# Patient Record
Sex: FEMALE | Race: WHITE | NOT HISPANIC OR LATINO | Employment: OTHER | URBAN - NONMETROPOLITAN AREA
[De-identification: names, ages, dates, MRNs, and addresses within clinical notes are randomized per-mention and may not be internally consistent; named-entity substitution may affect disease eponyms.]

---

## 2017-04-12 ENCOUNTER — OFFICE VISIT (OUTPATIENT)
Dept: CARDIOLOGY | Facility: CLINIC | Age: 62
End: 2017-04-12

## 2017-04-12 VITALS
WEIGHT: 241 LBS | HEIGHT: 66 IN | SYSTOLIC BLOOD PRESSURE: 120 MMHG | HEART RATE: 72 BPM | DIASTOLIC BLOOD PRESSURE: 70 MMHG | BODY MASS INDEX: 38.73 KG/M2

## 2017-04-12 DIAGNOSIS — Z79.01 CURRENT USE OF LONG TERM ANTICOAGULATION: ICD-10-CM

## 2017-04-12 DIAGNOSIS — F17.210 CIGARETTE SMOKER: ICD-10-CM

## 2017-04-12 DIAGNOSIS — J43.8 OTHER EMPHYSEMA (HCC): ICD-10-CM

## 2017-04-12 DIAGNOSIS — Z95.2 S/P AVR: Primary | ICD-10-CM

## 2017-04-12 DIAGNOSIS — I10 ESSENTIAL HYPERTENSION: ICD-10-CM

## 2017-04-12 DIAGNOSIS — I73.9 PAD (PERIPHERAL ARTERY DISEASE) (HCC): ICD-10-CM

## 2017-04-12 DIAGNOSIS — E78.00 HYPERCHOLESTEREMIA: ICD-10-CM

## 2017-04-12 DIAGNOSIS — I77.1 STENOSIS OF LEFT SUBCLAVIAN ARTERY (HCC): ICD-10-CM

## 2017-04-12 DIAGNOSIS — I77.9 RIGHT-SIDED CAROTID ARTERY DISEASE (HCC): ICD-10-CM

## 2017-04-12 PROCEDURE — 99213 OFFICE O/P EST LOW 20 MIN: CPT | Performed by: NURSE PRACTITIONER

## 2017-04-12 PROCEDURE — 99406 BEHAV CHNG SMOKING 3-10 MIN: CPT | Performed by: NURSE PRACTITIONER

## 2017-04-12 RX ORDER — ALBUTEROL SULFATE 2.5 MG/3ML
2.5 SOLUTION RESPIRATORY (INHALATION) EVERY 4 HOURS PRN
COMMUNITY
End: 2018-05-09 | Stop reason: ALTCHOICE

## 2017-04-12 NOTE — PROGRESS NOTES
Chief Complaint   Patient presents with   • Follow-up     Several episodes of chest pain that occured at rest, went to ER twice, told her more than likely noncardiac.  Pt feels related to pulmonary. SOB is about the same.    • Med Refill     PCP writes refills and moniters routine labs.    • Nicotine Dependence     Still smoking, states she is going to try to stop smoking again in the morning.        Subjective       Jolene Boyer is a 61 y.o. female with a complicated medical history. Pt has a history of severe aortic stenosis with s/p AVR in 2007. A carotid US was done in 2015 that showed a small ALMA. Before patient could be scheduled for the CTA, she went to the ER at [facility] with COPD exacerbation. She refused admission and went home on zpack and steroid therapy. She later become more short of breath and was admitted in January of 2016 with COPD exacerbation. A CTA was performed that showed 90% ALMA and left subclavian stenosis. Dr. Lehman was consulted and decided that since the patient had such frequent admissions for COPD and pneumonia and continued to smoke 1ppd, it would be better to manage with antiplatelet therapy and smoking cessation. He discussed with the patient possibility of repairing the subclavian if she could quit smoking. Later she was admitted to  for respiratory failure.. She was maintained on a ventilator until she could be weaned. She also underwent a repair of a rectus sheath hematoma while at  secondary to possible HIT with supratherapeutic PTT. Pt later developed SVT and was given adenosine and metoprolol. She then went into atrial flutter with plans for cardizem drip, when the patient converted to NSR.   At her June 2016 office visit Ms. Boyer reported a couple episodes of sharp chest pains and increased shortness of breath. An echocardiogram was ordered and showed valve well seated. IN August 2016 she was given clearance for EGD and colonoscopy. A repeat echocardiogram in December  "remained stable. Today she comes to the office for a follow up appointment. Her cardiac symptoms are \"about the same\". Unfortunately, she continues to smoke despite all her problems.     HPI         Cardiac History:    Past Surgical History:   Procedure Laterality Date   • ABDOMINAL SURGERY Left 01/10/2016    Rectus Sheath Hematoma removed   • AORTIC VALVE REPAIR/REPLACEMENT  06/2007    AVR per Dr. Trent   • CARDIOVASCULAR STRESS TEST  06/04/2007    Stress- 3min, 85% THR. BP- 190/96. R/O Apical Ischemia   • CARDIOVASCULAR STRESS TEST  05/13/2013    L. Myoview- ([facility]. Dr. Hernández) R/O Apical Ischemia   • CATH LAB PROCEDURE  06/08/2007    Cath- Mod. AI, Normal coronaries   • CHOLECYSTECTOMY     • COLONOSCOPY  2009   • COLONOSCOPY N/A 8/23/2016     COLONOSCOPY FOR SCREENING -  Surgeon: Meaghan Pham, DO:Hyperplastic polyp   • CORONARY STENT PLACEMENT     • ECHO - CONVERTED  07/17/2007    Echo- EF >60%, AVELINO 1.5 cm2   • ECHO - CONVERTED  01/18/2010    Echo- EF >60%. AVELINO 1.3 cm2   • ECHO - CONVERTED  05/03/2013    Echo- (Dr. Alfred) EF 65%   • ECHO - CONVERTED  12/17/2014    Echo- EF 65%. AVELINO- 1.6 cm2   • ECHO - CONVERTED  12/14/2016    LVH, EF 60%, prosthethic aortic valve well seated, mod PHT- RVSP 43 mmHg   • FOOT SURGERY Left    • HERNIA REPAIR     • IR ANGIO EXT CAROTID CIRC UNILATERAL  12/2015    CTA:  severe stenosis to ALMA, tight stenosis of left subclavian at takeoff   • KNEE SURGERY     • TONSILLECTOMY     • TUBAL ABDOMINAL LIGATION     • US CAROTID UNILATERAL  12/22/2015    Bilateral:  small ALMA, CTA recommended.       Current Outpatient Prescriptions   Medication Sig Dispense Refill   • albuterol (PROAIR HFA) 108 (90 BASE) MCG/ACT inhaler Inhale 2 puffs every 4 (four) hours as needed for wheezing.     • albuterol (PROVENTIL) (2.5 MG/3ML) 0.083% nebulizer solution Take 2.5 mg by nebulization Every 4 (Four) Hours As Needed for Wheezing.     • atenolol (TENORMIN) 25 MG tablet Take 25 mg by mouth daily.   "   • buPROPion SR (WELLBUTRIN SR) 150 MG 12 hr tablet Take 150 mg by mouth 2 (two) times a day.     • furosemide (LASIX) 40 MG tablet Take 40 mg by mouth Daily. Can take twice a day prn increased edema     • guaifenesin-dextromethorphan (MUCINEX DM)  MG tablet sustained-release 12 hour tablet Take  by mouth every 12 (twelve) hours.     • lisinopril (PRINIVIL,ZESTRIL) 5 MG tablet Take 5 mg by mouth daily.     • metFORMIN (GLUCOPHAGE) 500 MG tablet Take 500 mg by mouth 2 (two) times a day with meals.     • nitroglycerin (NITROSTAT) 0.4 MG SL tablet 1 under the tongue as needed for angina, may repeat q5mins for up three doses 30 tablet 8   • O2 (OXYGEN) Inhale 2 L/min Every Night.     • potassium chloride (K-DUR,KLOR-CON) 20 MEQ CR tablet Take 20 mEq by mouth. Takes once a day     • simvastatin (ZOCOR) 40 MG tablet Take 40 mg by mouth every night.     • theophylline (MIGDALIA-24) 300 MG 24 hr capsule Take 300 mg by mouth Daily.     • Umeclidinium Bromide (INCRUSE ELLIPTA) 62.5 MCG/INH aerosol powder  Inhale.     • venlafaxine (EFFEXOR) 75 MG tablet Take 75 mg by mouth 2 (two) times a day.     • warfarin (COUMADIN) 10 MG tablet Take 10 mg by mouth daily. (PCP monitors)       No current facility-administered medications for this visit.        Risperdal [risperidone]; Seroquel [quetiapine fumarate]; and Zoloft [sertraline hcl]    Past Medical History:   Diagnosis Date   • Anemia    • Anticoagulant long-term use    • Anxiety    • Aortic valve prosthesis present    • Arthritis    • Asthma    • Chronic cough    • Colon polyps    • COPD (chronic obstructive pulmonary disease)     followed by Dr. Kilgore   • Depression    • Diabetes    • GERD (gastroesophageal reflux disease)    • History of anticoagulant therapy     Chronic. Prosthetic Valve    • History of blood transfusion     Pt states she had reaction to blood that causes abnormal heart rhythm    • History of cholecystectomy    • Hypercholesteremia    • Hyperlipidemia    •  "Hypertension    • Panic attack    • PHT (portal hypertension)    • Sleep apnea        Social History     Social History   • Marital status:      Spouse name: N/A   • Number of children: 1   • Years of education: Associates Degree     Occupational History   • RETIRED      Medicaid Worker      Social History Main Topics   • Smoking status: Current Every Day Smoker     Packs/day: 1.00     Years: 39.00     Types: Cigarettes   • Smokeless tobacco: Never Used   • Alcohol use No   • Drug use: No   • Sexual activity: Defer     Other Topics Concern   • Not on file     Social History Narrative       Family History   Problem Relation Age of Onset   • Coronary artery disease Mother    • Colon polyps Mother    • Colon cancer Mother    • Ulcers Mother    • Cancer Mother    • Cancer Father    • Diabetes Father    • Hypertension Brother    • Diabetes Brother    • Heart failure Maternal Grandmother    • Heart failure Maternal Grandfather    • Lung disease Paternal Grandfather        Review of Systems   Constitutional: Positive for fatigue. Negative for activity change and appetite change.   HENT: Negative for nosebleeds, sinus pressure and trouble swallowing.    Gastrointestinal: Positive for nausea.   Genitourinary: Positive for dysuria, hematuria (mild) and urgency.   Musculoskeletal: Positive for arthralgias. Negative for gait problem and myalgias.   Neurological: Positive for light-headedness (rarely) and numbness (left hand). Negative for dizziness, syncope and weakness.   Hematological: Bruises/bleeds easily.   Psychiatric/Behavioral: Positive for sleep disturbance (uses bipap). Negative for confusion. The patient is nervous/anxious.        Diabetes- Yes  Thyroid-normal    Objective     /70  Pulse 72  Ht 66\" (167.6 cm)  Wt 241 lb (109 kg)  BMI 38.9 kg/m2    Physical Exam   Constitutional: She is oriented to person, place, and time. Vital signs are normal.   Eyes: Pupils are equal, round, and reactive to " light.   Neck: Neck supple. Carotid bruit is not present. No edema present.   Cardiovascular: Normal rate, regular rhythm and S1 normal.    Murmur heard.   Systolic murmur is present with a grade of 2/6   Pulses:       Radial pulses are 2+ on the right side, and 2+ on the left side.   Loud S2   Pulmonary/Chest: No respiratory distress. She has decreased breath sounds in the right lower field and the left lower field. She has wheezes.   Abdominal: Soft. Bowel sounds are normal.   Musculoskeletal: She exhibits no edema.   Neurological: She is alert and oriented to person, place, and time.   Skin: Skin is warm and dry.   Psychiatric: She has a normal mood and affect. Her behavior is normal. Judgment and thought content normal.   Vitals reviewed.    Procedures        Assessment/Plan      Jolene was seen today for follow-up, med refill and nicotine dependence.    Diagnoses and all orders for this visit:    S/P AVR    Stenosis of left subclavian artery    Other emphysema    PAD (peripheral artery disease)    Right-sided carotid artery disease    Essential hypertension    Cigarette smoker    Hypercholesteremia    Current use of long term anticoagulation      The report of her recent echocardiogram was reviewed which showed her valve prothesis well seated. Her blood pressure and heart rate are stable. Her weight has increased about 15 pounds which she attributes to over eating. I discussed with her significant need to stop smoking and change dietary habits for her overall health. She understands she is not a candidate for surgery on left subclavian as long as she continues to smoke. We discussed for over 3 minutes use of patches, Jian Johnathan method available at local health department and Chantix. Her insurance does not cover Chantix. She is willing to check on Jian Johnathan program.  For management of COPD she follows with Dr. Gonzalez. She follows with PCP for management of labs. No changes to her current cardiac  medication made today. No further cardiac workup ordered at this time. We will see her back in 6 months or sooner for problems.              Electronically signed by TANIA Trivedi,  April 13, 2017 11:47 AM

## 2017-04-13 ENCOUNTER — TELEPHONE (OUTPATIENT)
Dept: CARDIOLOGY | Facility: CLINIC | Age: 62
End: 2017-04-13

## 2017-04-13 NOTE — TELEPHONE ENCOUNTER
CLAUDIA    Received call from patient stating that you wanted to know name of medication she was taking states is Topamax. Thank you.

## 2017-04-13 NOTE — TELEPHONE ENCOUNTER
She can try topamax 25 mg daily since she has found beneficial in the past to help her stop smoking.

## 2017-10-16 ENCOUNTER — OFFICE VISIT (OUTPATIENT)
Dept: CARDIOLOGY | Facility: CLINIC | Age: 62
End: 2017-10-16

## 2017-10-16 ENCOUNTER — OUTSIDE FACILITY SERVICE (OUTPATIENT)
Dept: CARDIOLOGY | Facility: CLINIC | Age: 62
End: 2017-10-16

## 2017-10-16 ENCOUNTER — HOSPITAL ENCOUNTER (OUTPATIENT)
Dept: CARDIOLOGY | Facility: HOSPITAL | Age: 62
Discharge: HOME | End: 2017-10-16
Admitting: NURSE PRACTITIONER

## 2017-10-16 VITALS
SYSTOLIC BLOOD PRESSURE: 130 MMHG | BODY MASS INDEX: 36.8 KG/M2 | HEART RATE: 60 BPM | DIASTOLIC BLOOD PRESSURE: 70 MMHG | HEIGHT: 66 IN | WEIGHT: 229 LBS

## 2017-10-16 DIAGNOSIS — R07.89 OTHER CHEST PAIN: ICD-10-CM

## 2017-10-16 DIAGNOSIS — J43.8 OTHER EMPHYSEMA (HCC): ICD-10-CM

## 2017-10-16 DIAGNOSIS — R06.02 SHORTNESS OF BREATH: ICD-10-CM

## 2017-10-16 DIAGNOSIS — Z79.01 CHRONIC ANTICOAGULATION: ICD-10-CM

## 2017-10-16 DIAGNOSIS — I10 ESSENTIAL HYPERTENSION: ICD-10-CM

## 2017-10-16 DIAGNOSIS — Z95.2 S/P AVR: ICD-10-CM

## 2017-10-16 DIAGNOSIS — E78.49 OTHER HYPERLIPIDEMIA: ICD-10-CM

## 2017-10-16 DIAGNOSIS — Z95.2 S/P AVR: Primary | ICD-10-CM

## 2017-10-16 PROCEDURE — 93306 TTE W/DOPPLER COMPLETE: CPT | Performed by: INTERNAL MEDICINE

## 2017-10-16 PROCEDURE — 93306 TTE W/DOPPLER COMPLETE: CPT

## 2017-10-16 PROCEDURE — 99214 OFFICE O/P EST MOD 30 MIN: CPT | Performed by: NURSE PRACTITIONER

## 2017-10-16 RX ORDER — VARENICLINE TARTRATE 1 MG/1
1 TABLET, FILM COATED ORAL DAILY
COMMUNITY
End: 2018-05-09 | Stop reason: SDDI

## 2017-10-16 NOTE — PROGRESS NOTES
Chief Complaint   Patient presents with   • Follow-up     For cardiac management. Patient did not bring medication list-verbalized current medication list, PCP refills medications. She reports last labs last week. She reports was in ER about a week ago due to bleeding, she is to have uterine biopsy soon.   • Palpitations     She reports having occasional palpitations.   • Shortness of Breath     She reports always has shortness of breath relates to COPD. She states has some pain in left chest, relates to left lung.       Cardiac Complaints  chest pressure/discomfort and dyspnea      Subjective   Jolene Boyer is a 62 y.o. female with a complicated medical history including severe aortic stenosis with s/p AVR in 2007. A carotid US was done in 2015 that showed a small ALMA. A CTA was performed in the hospital at [facility] in January of 2016 while the patient was admitted for COPD exacerbation, that showed 90% ALMA and left subclavian stenosis. Dr. Lehman was consulted and decided that since the patient had such frequent admissions for COPD and pneumonia and continued to smoke 1ppd, it would be better to manage with antiplatelet therapy and smoking cessation. Later while at  for rectus sheath hematoma due to possible HIT with subtherapeutic PTT and  later developed SVT and was given adenosine and metoprolol, then later She then went into atrial flutter with plans for cardizem drip, when the patient converted to NSR. Most recent echo in December of 2016 showed aortic valve well seated and no other significant abnormalities seen. She returns today for follow up and denies any new cardiac concerns.  She does admit to some occasional palpitations but states they are no worse than prior.  She denies chest pain but does admit to some shortness of breath which she always has and relates to her COPD, for which she continues to follow with Dr. Gonzalez in regards.  She says it seems like it is hard to breath sometimes and states it  will hurt in her left lung when she takes a deep breath.  She also reports being in the ER at [facility] last week for bleeding, which she reports was coming from her uterus.  She states at ER they said her blood was too thin and then they held her coumadin, she states it has now gotten too thick and she is to have her blood rechecked tomorrow with Dr. Savage.   She is scheduled for a uterine biopsy again soon.  She is unfortunately still smoking but is now taking chantix in hopes of quitting. Labs she reports with PCP as well as INR      Cardiac History  Past Surgical History:   Procedure Laterality Date   • ABDOMINAL SURGERY Left 01/10/2016    Rectus Sheath Hematoma removed   • AORTIC VALVE REPAIR/REPLACEMENT  06/2007    AVR per Dr. Trent   • CARDIOVASCULAR STRESS TEST  06/04/2007    Stress- 3min, 85% THR. BP- 190/96. R/O Apical Ischemia   • CARDIOVASCULAR STRESS TEST  05/13/2013    L. Myoview- ([facility]. Dr. Hernández) R/O Apical Ischemia   • CATH LAB PROCEDURE  06/08/2007    Cath- Mod. AI, Normal coronaries   • CHOLECYSTECTOMY     • COLONOSCOPY  2009   • COLONOSCOPY N/A 8/23/2016     COLONOSCOPY FOR SCREENING -  Surgeon: Meaghan Pham, DO:Hyperplastic polyp   • CORONARY STENT PLACEMENT     • ECHO - CONVERTED  07/17/2007    Echo- EF >60%, AVELINO 1.5 cm2   • ECHO - CONVERTED  01/18/2010    Echo- EF >60%. AVELINO 1.3 cm2   • ECHO - CONVERTED  05/03/2013    Echo- (Dr. Alfred) EF 65%   • ECHO - CONVERTED  12/17/2014    Echo- EF 65%. AVELINO- 1.6 cm2   • ECHO - CONVERTED  12/14/2016    LVH, EF 60%, prosthethic aortic valve well seated, mod PHT- RVSP 43 mmHg   • FOOT SURGERY Left    • HERNIA REPAIR     • IR ANGIO EXT CAROTID CIRC UNILATERAL  12/2015    CTA:  severe stenosis to ALMA, tight stenosis of left subclavian at takeoff   • KNEE SURGERY     • TONSILLECTOMY     • TUBAL ABDOMINAL LIGATION     • US CAROTID UNILATERAL  12/22/2015    Bilateral:  small ALMA, CTA recommended.       Current Outpatient Prescriptions   Medication Sig  Dispense Refill   • albuterol (PROVENTIL) (2.5 MG/3ML) 0.083% nebulizer solution Take 2.5 mg by nebulization Every 4 (Four) Hours As Needed for Wheezing.     • Albuterol Sulfate (PROAIR HFA IN) Inhale As Needed.     • atenolol (TENORMIN) 25 MG tablet Take 25 mg by mouth daily.     • Fluticasone Furoate-Vilanterol (BREO ELLIPTA IN) Inhale Daily.     • lisinopril (PRINIVIL,ZESTRIL) 5 MG tablet Take 5 mg by mouth daily.     • metFORMIN (GLUCOPHAGE) 1000 MG tablet Take 1,000 mg by mouth Daily With Breakfast.     • nitroglycerin (NITROSTAT) 0.4 MG SL tablet 1 under the tongue as needed for angina, may repeat q5mins for up three doses 30 tablet 8   • O2 (OXYGEN) Inhale 2 L/min Every Night.     • simvastatin (ZOCOR) 40 MG tablet Take 40 mg by mouth every night.     • Umeclidinium Bromide (INCRUSE ELLIPTA) 62.5 MCG/INH aerosol powder  Inhale.     • varenicline (CHANTIX) 1 MG tablet Take 1 mg by mouth Daily.     • warfarin (COUMADIN) 10 MG tablet Take 10 mg by mouth daily. (PCP monitors)     • guaifenesin-dextromethorphan (MUCINEX DM)  MG tablet sustained-release 12 hour tablet Take  by mouth 2 (Two) Times a Day As Needed.       No current facility-administered medications for this visit.        Risperdal [risperidone]; Seroquel [quetiapine fumarate]; and Zoloft [sertraline hcl]    Past Medical History:   Diagnosis Date   • Anemia    • Anticoagulant long-term use    • Anxiety    • Aortic valve prosthesis present    • Arthritis    • Asthma    • Chronic cough    • Colon polyps    • COPD (chronic obstructive pulmonary disease)     followed by Dr. Kilgore   • Depression    • Diabetes    • GERD (gastroesophageal reflux disease)    • History of anticoagulant therapy     Chronic. Prosthetic Valve    • History of blood transfusion     Pt states she had reaction to blood that causes abnormal heart rhythm    • History of cholecystectomy    • Hypercholesteremia    • Hyperlipidemia    • Hypertension    • Panic attack    • PHT  "(portal hypertension)    • Sleep apnea        Social History     Social History   • Marital status:      Spouse name: N/A   • Number of children: 1   • Years of education: Associates Degree     Occupational History   • RETIRED      Medicaid Worker      Social History Main Topics   • Smoking status: Current Every Day Smoker     Packs/day: 1.00     Years: 39.00     Types: Cigarettes   • Smokeless tobacco: Never Used   • Alcohol use No   • Drug use: No   • Sexual activity: Defer     Other Topics Concern   • Not on file     Social History Narrative       Family History   Problem Relation Age of Onset   • Coronary artery disease Mother    • Colon polyps Mother    • Colon cancer Mother    • Ulcers Mother    • Cancer Mother    • Cancer Father    • Diabetes Father    • Hypertension Brother    • Diabetes Brother    • Heart failure Maternal Grandmother    • Heart failure Maternal Grandfather    • Lung disease Paternal Grandfather        Review of Systems   Constitution: Negative for malaise/fatigue.   Cardiovascular: Positive for chest pain and dyspnea on exertion. Negative for irregular heartbeat, leg swelling and palpitations.   Respiratory: Positive for cough and shortness of breath. Negative for wheezing.    Musculoskeletal: Negative for arthritis and back pain.   Gastrointestinal: Negative for anorexia, heartburn and nausea.   Genitourinary: Negative for dysuria, hematuria and nocturia.   Neurological: Negative for dizziness, focal weakness and headaches.   Psychiatric/Behavioral: Negative for altered mental status and depression.       DiabetesNo  Thyroidnormal    Objective     /70 (BP Location: Right arm)  Pulse 60  Ht 66\" (167.6 cm)  Wt 229 lb (104 kg)  BMI 36.96 kg/m2    Physical Exam   Constitutional: She is oriented to person, place, and time. She appears well-developed.   HENT:   Head: Normocephalic and atraumatic.   Eyes: EOM are normal. Pupils are equal, round, and reactive to light.   Neck: " Normal range of motion. Neck supple.   Cardiovascular: Normal rate and regular rhythm.  Exam reveals decreased pulses.    Murmur heard.  Pulmonary/Chest: Effort normal and breath sounds normal.   Abdominal: Soft.   Musculoskeletal: Normal range of motion.   Neurological: She is alert and oriented to person, place, and time.   Skin: Skin is warm and dry.   Psychiatric: She has a normal mood and affect. Her behavior is normal.       Procedures    Assessment/Plan     HR and BP are both stable today.  No changes to current regimen will be advised.  Echo will be advised to reassess for any worsening pulmonary HTN or WMA that may be attributing to her shortness of breath/chest discomfort on the left side, we will also pay attention to the AVELINO since she is s/p AVR.  More recommendations to follow.  She continues to follow with Dr. Gonzalez in regards to COPD and reports recent medication changes she states she has done well with the changes and is controlling her COPD better.  She is now taking chantix in hopes to quit smoking and we counseled on the benefit, including possible surgery in the future to correct left subclavian stenosis, as she was encouraged to be smoke free for 6 months before surgery could be performed per Dr. Lehman's recommendation. Labs are done with you, she reports most recent showed INR too low, she is having INR redrawn tomorrow, no coumadin adjustment has been made.  Could we get most recent lab panel for our records? No refills needed as they are done with PCP as well.  She states she is to have uterine biopsy soon in regards to uterine bleeding.  Good cardiac diet and activity as tolerated advised.   6 month follow up scheduled with patient advised to call with concerns.  She reports flu shot with you last week, she is up to date on pneumonia vaccines.         Problems Addressed this Visit        Cardiovascular and Mediastinum    S/P AVR - Primary    Relevant Orders    Adult Transthoracic Echo  Complete W/ Cont if Necessary Per Protocol       Respiratory    COPD (chronic obstructive pulmonary disease)    Relevant Medications    Fluticasone Furoate-Vilanterol (BREO ELLIPTA IN)    Albuterol Sulfate (PROAIR HFA IN)    Other Relevant Orders    Adult Transthoracic Echo Complete W/ Cont if Necessary Per Protocol    Shortness of breath    Relevant Orders    Adult Transthoracic Echo Complete W/ Cont if Necessary Per Protocol      Other Visit Diagnoses     Essential hypertension        Other hyperlipidemia        Chronic anticoagulation        Other chest pain        Relevant Orders    Adult Transthoracic Echo Complete W/ Cont if Necessary Per Protocol                  Electronically signed by TANIA Hernandez October 16, 2017 10:31 AM

## 2018-05-09 ENCOUNTER — OFFICE VISIT (OUTPATIENT)
Dept: CARDIOLOGY | Facility: CLINIC | Age: 63
End: 2018-05-09

## 2018-05-09 VITALS
HEART RATE: 68 BPM | HEIGHT: 66 IN | DIASTOLIC BLOOD PRESSURE: 58 MMHG | SYSTOLIC BLOOD PRESSURE: 114 MMHG | BODY MASS INDEX: 37.45 KG/M2 | WEIGHT: 233 LBS

## 2018-05-09 DIAGNOSIS — E66.9 OBESITY (BMI 30-39.9): ICD-10-CM

## 2018-05-09 DIAGNOSIS — I10 ESSENTIAL HYPERTENSION: ICD-10-CM

## 2018-05-09 DIAGNOSIS — R01.1 CARDIAC MURMUR: ICD-10-CM

## 2018-05-09 DIAGNOSIS — F17.200 SMOKER: ICD-10-CM

## 2018-05-09 DIAGNOSIS — E78.00 HYPERCHOLESTEREMIA: ICD-10-CM

## 2018-05-09 DIAGNOSIS — Z95.2 S/P AVR: Primary | ICD-10-CM

## 2018-05-09 DIAGNOSIS — F17.200 TOBACCO USE DISORDER: ICD-10-CM

## 2018-05-09 DIAGNOSIS — Z79.01 CURRENT USE OF LONG TERM ANTICOAGULATION: ICD-10-CM

## 2018-05-09 PROCEDURE — 99213 OFFICE O/P EST LOW 20 MIN: CPT | Performed by: NURSE PRACTITIONER

## 2018-05-09 RX ORDER — ATENOLOL 50 MG/1
50 TABLET ORAL DAILY
COMMUNITY
End: 2021-03-23

## 2018-05-09 RX ORDER — FUROSEMIDE 40 MG/1
40 TABLET ORAL DAILY
COMMUNITY
End: 2021-03-23 | Stop reason: SDUPTHER

## 2018-05-09 RX ORDER — ALBUTEROL SULFATE 90 UG/1
2 AEROSOL, METERED RESPIRATORY (INHALATION) EVERY 4 HOURS PRN
COMMUNITY

## 2018-05-09 NOTE — PATIENT INSTRUCTIONS
Calorie Counting for Weight Loss  Calories are units of energy. Your body needs a certain amount of calories from food to keep you going throughout the day. When you eat more calories than your body needs, your body stores the extra calories as fat. When you eat fewer calories than your body needs, your body burns fat to get the energy it needs.  Calorie counting means keeping track of how many calories you eat and drink each day. Calorie counting can be helpful if you need to lose weight. If you make sure to eat fewer calories than your body needs, you should lose weight. Ask your health care provider what a healthy weight is for you.  For calorie counting to work, you will need to eat the right number of calories in a day in order to lose a healthy amount of weight per week. A dietitian can help you determine how many calories you need in a day and will give you suggestions on how to reach your calorie goal.  · A healthy amount of weight to lose per week is usually 1-2 lb (0.5-0.9 kg). This usually means that your daily calorie intake should be reduced by 500-750 calories.  · Eating 1,200 - 1,500 calories per day can help most women lose weight.  · Eating 1,500 - 1,800 calories per day can help most men lose weight.  What is my plan?  My goal is to have __________ calories per day.  If I have this many calories per day, I should lose around __________ pounds per week.  What do I need to know about calorie counting?  In order to meet your daily calorie goal, you will need to:  · Find out how many calories are in each food you would like to eat. Try to do this before you eat.  · Decide how much of the food you plan to eat.  · Write down what you ate and how many calories it had. Doing this is called keeping a food log.  To successfully lose weight, it is important to balance calorie counting with a healthy lifestyle that includes regular activity. Aim for 150 minutes of moderate exercise (such as walking) or 75  minutes of vigorous exercise (such as running) each week.  Where do I find calorie information?     The number of calories in a food can be found on a Nutrition Facts label. If a food does not have a Nutrition Facts label, try to look up the calories online or ask your dietitian for help.  Remember that calories are listed per serving. If you choose to have more than one serving of a food, you will have to multiply the calories per serving by the amount of servings you plan to eat. For example, the label on a package of bread might say that a serving size is 1 slice and that there are 90 calories in a serving. If you eat 1 slice, you will have eaten 90 calories. If you eat 2 slices, you will have eaten 180 calories.  How do I keep a food log?  Immediately after each meal, record the following information in your food log:  · What you ate. Don't forget to include toppings, sauces, and other extras on the food.  · How much you ate. This can be measured in cups, ounces, or number of items.  · How many calories each food and drink had.  · The total number of calories in the meal.  Keep your food log near you, such as in a small notebook in your pocket, or use a mobile [place] or website. Some programs will calculate calories for you and show you how many calories you have left for the day to meet your goal.  What are some calorie counting tips?  · Use your calories on foods and drinks that will fill you up and not leave you hungry:  ¨ Some examples of foods that fill you up are nuts and nut butters, vegetables, lean proteins, and high-fiber foods like whole grains. High-fiber foods are foods with more than 5 g fiber per serving.  ¨ Drinks such as sodas, specialty coffee drinks, alcohol, and juices have a lot of calories, yet do not fill you up.  · Eat nutritious foods and avoid empty calories. Empty calories are calories you get from foods or beverages that do not have many vitamins or protein, such as candy, sweets, and  "soda. It is better to have a nutritious high-calorie food (such as an avocado) than a food with few nutrients (such as a bag of chips).  · Know how many calories are in the foods you eat most often. This will help you calculate calorie counts faster.  · Pay attention to calories in drinks. Low-calorie drinks include water and unsweetened drinks.  · Pay attention to nutrition labels for \"low fat\" or \"fat free\" foods. These foods sometimes have the same amount of calories or more calories than the full fat versions. They also often have added sugar, starch, or salt, to make up for flavor that was removed with the fat.  · Find a way of tracking calories that works for you. Get creative. Try different apps or programs if writing down calories does not work for you.  What are some portion control tips?  · Know how many calories are in a serving. This will help you know how many servings of a certain food you can have.  · Use a measuring cup to measure serving sizes. You could also try weighing out portions on a kitchen scale. With time, you will be able to estimate serving sizes for some foods.  · Take some time to put servings of different foods on your favorite plates, bowls, and cups so you know what a serving looks like.  · Try not to eat straight from a bag or box. Doing this can lead to overeating. Put the amount you would like to eat in a cup or on a plate to make sure you are eating the right portion.  · Use smaller plates, glasses, and bowls to prevent overeating.  · Try not to multitask (for example, watch TV or use your computer) while eating. If it is time to eat, sit down at a table and enjoy your food. This will help you to know when you are full. It will also help you to be aware of what you are eating and how much you are eating.  What are tips for following this plan?  Reading food labels   · Check the calorie count compared to the serving size. The serving size may be smaller than what you are used to " eating.  · Check the source of the calories. Make sure the food you are eating is high in vitamins and protein and low in saturated and trans fats.  Shopping   · Read nutrition labels while you shop. This will help you make healthy decisions before you decide to purchase your food.  · Make a grocery list and stick to it.  Cooking   · Try to cook your favorite foods in a healthier way. For example, try baking instead of frying.  · Use low-fat dairy products.  Meal planning   · Use more fruits and vegetables. Half of your plate should be fruits and vegetables.  · Include lean proteins like poultry and fish.  How do I count calories when eating out?  · Ask for smaller portion sizes.  · Consider sharing an entree and sides instead of getting your own entree.  · If you get your own entree, eat only half. Ask for a box at the beginning of your meal and put the rest of your entree in it so you are not tempted to eat it.  · If calories are listed on the menu, choose the lower calorie options.  · Choose dishes that include vegetables, fruits, whole grains, low-fat dairy products, and lean protein.  · Choose items that are boiled, broiled, grilled, or steamed. Stay away from items that are buttered, battered, fried, or served with cream sauce. Items labeled “crispy” are usually fried, unless stated otherwise.  · Choose water, low-fat milk, unsweetened iced tea, or other drinks without added sugar. If you want an alcoholic beverage, choose a lower calorie option such as a glass of wine or light beer.  · Ask for dressings, sauces, and syrups on the side. These are usually high in calories, so you should limit the amount you eat.  · If you want a salad, choose a garden salad and ask for grilled meats. Avoid extra toppings like herrera, cheese, or fried items. Ask for the dressing on the side, or ask for olive oil and vinegar or lemon to use as dressing.  · Estimate how many servings of a food you are given. For example, a serving  of cooked rice is ½ cup or about the size of half a baseball. Knowing serving sizes will help you be aware of how much food you are eating at restaurants. The list below tells you how big or small some common portion sizes are based on everyday objects:  ¨ 1 oz--4 stacked dice.  ¨ 3 oz--1 deck of cards.  ¨ 1 tsp--1 die.  ¨ 1 Tbsp--½ a ping-pong ball.  ¨ 2 Tbsp--1 ping-pong ball.  ¨ ½ cup--½ baseball.  ¨ 1 cup--1 baseball.  Summary  · Calorie counting means keeping track of how many calories you eat and drink each day. If you eat fewer calories than your body needs, you should lose weight.  · A healthy amount of weight to lose per week is usually 1-2 lb (0.5-0.9 kg). This usually means reducing your daily calorie intake by 500-750 calories.  · The number of calories in a food can be found on a Nutrition Facts label. If a food does not have a Nutrition Facts label, try to look up the calories online or ask your dietitian for help.  · Use your calories on foods and drinks that will fill you up, and not on foods and drinks that will leave you hungry.  · Use smaller plates, glasses, and bowls to prevent overeating.  This information is not intended to replace advice given to you by your health care provider. Make sure you discuss any questions you have with your health care provider.  Document Released: 12/18/2006 Document Revised: 11/17/2017 Document Reviewed: 11/17/2017  The Original [employer] Interactive Patient Education © 2017 [employer].    Steps to Quit Smoking  Smoking tobacco can be bad for your health. It can also affect almost every organ in your body. Smoking puts you and people around you at risk for many serious long-lasting (chronic) diseases. Quitting smoking is hard, but it is one of the best things that you can do for your health. It is never too late to quit.  What are the benefits of quitting smoking?  When you quit smoking, you lower your risk for getting serious diseases and conditions. They can  include:  · Lung cancer or lung disease.  · Heart disease.  · Stroke.  · Heart attack.  · Not being able to have children (infertility).  · Weak bones (osteoporosis) and broken bones (fractures).  If you have coughing, wheezing, and shortness of breath, those symptoms may get better when you quit. You may also get sick less often. If you are pregnant, quitting smoking can help to lower your chances of having a baby of low birth weight.  What can I do to help me quit smoking?  Talk with your doctor about what can help you quit smoking. Some things you can do (strategies) include:  · Quitting smoking totally, instead of slowly cutting back how much you smoke over a period of time.  · Going to in-person counseling. You are more likely to quit if you go to many counseling sessions.  · Using resources and support systems, such as:  ¨ Online chats with a counselor.  ¨ Phone quitlines.  ¨ Printed self-help materials.  ¨ Support groups or group counseling.  ¨ Text messaging programs.  ¨ Mobile phone apps or applications.  · Taking medicines. Some of these medicines may have nicotine in them. If you are pregnant or breastfeeding, do not take any medicines to quit smoking unless your doctor says it is okay. Talk with your doctor about counseling or other things that can help you.  Talk with your doctor about using more than one strategy at the same time, such as taking medicines while you are also going to in-person counseling. This can help make quitting easier.  What things can I do to make it easier to quit?  Quitting smoking might feel very hard at first, but there is a lot that you can do to make it easier. Take these steps:  · Talk to your family and friends. Ask them to support and encourage you.  · Call phone quitlines, reach out to support groups, or work with a counselor.  · Ask people who smoke to not smoke around you.  · Avoid places that make you want (trigger) to smoke, such  as:  ¨ Bars.  ¨ Parties.  ¨ Smoke-break areas at work.  · Spend time with people who do not smoke.  · Lower the stress in your life. Stress can make you want to smoke. Try these things to help your stress:  ¨ Getting regular exercise.  ¨ Deep-breathing exercises.  ¨ Yoga.  ¨ Meditating.  ¨ Doing a body scan. To do this, close your eyes, focus on one area of your body at a time from head to toe, and notice which parts of your body are tense. Try to relax the muscles in those areas.  · Download or buy apps on your mobile phone or tablet that can help you stick to your quit plan. There are many free apps, such as QuitGuide from the CDC (Centers for Disease Control and Prevention). You can find more support from smokefree.gov and other websites.  This information is not intended to replace advice given to you by your health care provider. Make sure you discuss any questions you have with your health care provider.  Document Released: 10/14/2010 Document Revised: 08/15/2017 Document Reviewed: 05/03/2016  Elsevier Interactive Patient Education © 2017 [employer] Inc.

## 2018-05-09 NOTE — PROGRESS NOTES
Chief Complaint   Patient presents with   • Follow-up     for cardiac management   • Med Refill     PCP writes refills and monitors labs.    • Medication changes     atenolol has been increased, Theophylline has been started       Subjective       Jolene Boyer is a 62 y.o. female  with a complicated medical history including severe aortic stenosis with s/p AVR in 2007. A carotid US was done in 2015 that showed a small ALMA. A CTA was performed at [facility] in January of 2016, while she was admitted for COPD exacerbation, that showed 90% ALMA and left subclavian stenosis. Dr. Lehman was consulted and decided since she had frequent admissions for COPD and pneumonia and continued to smoke 1ppd, it would be better to manage with antiplatelet therapy and smoking cessation. Later, she was at  for rectus sheath hematoma due to possible HIT with subtherapeutic PTT. She developed SVT,  given adenosine and metoprolol. She then went into atrial flutter with plans for cardizem drip, but converted to NSR. In December of 2016, a repeat echocardiogram showed aortic valve well seated and no other significant abnormalities. At her October 2017 visit she had more shortness of breath and chest tightness. An echocardiogram revealed good LV ejection fraction, well seated and normally functioning mechanical aortic valve. MR was mild and RVSP 24 mmHg.   Today she comes to the office and no new or worsening cardiac symptoms reported. After increase in atenolol and theophylline added she reports some improvement of shortness of breath. Her recently stopped smoking and she plans to try to stop.     HPI     Cardiac History:    Past Surgical History:   Procedure Laterality Date   • ABDOMINAL SURGERY Left 01/10/2016    Rectus Sheath Hematoma removed   • AORTIC VALVE REPAIR/REPLACEMENT  06/2007    AVR per Dr. Trent   • CARDIAC CATHETERIZATION  06/08/2007    Cath- Mod. AI, Normal coronaries   • CARDIOVASCULAR STRESS TEST  06/04/2007    Stress-  3min, 85% THR. BP- 190/96. R/O Apical Ischemia   • CARDIOVASCULAR STRESS TEST  05/13/2013    L. Myoview- ([facility]. Dr. Hernández) R/O Apical Ischemia   • COLONOSCOPY  2009   • COLONOSCOPY N/A 8/23/2016     COLONOSCOPY FOR SCREENING -  Surgeon: Meaghan Pham, DO:Hyperplastic polyp   • ECHO - CONVERTED  07/17/2007    Echo- EF >60%, AVELINO 1.5 cm2   • ECHO - CONVERTED  01/18/2010    Echo- EF >60%. AVELINO 1.3 cm2   • ECHO - CONVERTED  05/03/2013    Echo- (Dr. Alfred) EF 65%   • ECHO - CONVERTED  12/17/2014    Echo- EF 65%. AVELINO- 1.6 cm2   • ECHO - CONVERTED  12/14/2016    LVH, EF 60%, prosthethic aortic valve well seated, RVSP 43 mmHg   • ECHO - CONVERTED  10/16/2017    EF 65%. AVR. RVSP- 24 mmHg   • IR ANGIO EXT CAROTID CIRC UNILATERAL  12/2015    CTA:  severe stenosis to ALMA, tight stenosis of left subclavian at takeoff   • US CAROTID UNILATERAL  12/22/2015    Bilateral:  small AMLA, CTA recommended.       Current Outpatient Prescriptions   Medication Sig Dispense Refill   • albuterol (PROVENTIL HFA;VENTOLIN HFA) 108 (90 Base) MCG/ACT inhaler Inhale 2 puffs Every 4 (Four) Hours As Needed for Wheezing.     • Albuterol Sulfate (PROAIR HFA IN) Inhale As Needed.     • atenolol (TENORMIN) 50 MG tablet Take 50 mg by mouth Daily.     • Fluticasone Furoate-Vilanterol (BREO ELLIPTA IN) Inhale Daily.     • furosemide (LASIX) 40 MG tablet Take 40 mg by mouth Daily.     • guaifenesin-dextromethorphan (MUCINEX DM)  MG tablet sustained-release 12 hour tablet Take  by mouth 2 (Two) Times a Day As Needed.     • lisinopril (PRINIVIL,ZESTRIL) 5 MG tablet Take 5 mg by mouth daily.     • metFORMIN (GLUCOPHAGE) 1000 MG tablet Take 1,000 mg by mouth Daily With Breakfast.     • nitroglycerin (NITROSTAT) 0.4 MG SL tablet 1 under the tongue as needed for angina, may repeat q5mins for up three doses 30 tablet 8   • O2 (OXYGEN) Inhale 2 L/min Every Night.     • simvastatin (ZOCOR) 40 MG tablet Take 40 mg by mouth every night.     • theophylline  (MIGDALIA-24) 300 MG 24 hr capsule Take 300 mg by mouth Daily.     • Umeclidinium Bromide (INCRUSE ELLIPTA) 62.5 MCG/INH aerosol powder  Inhale.     • warfarin (COUMADIN) 10 MG tablet Take 10 mg by mouth daily. (PCP monitors)       No current facility-administered medications for this visit.        Risperdal [risperidone]; Seroquel [quetiapine fumarate]; and Zoloft [sertraline hcl]    Past Medical History:   Diagnosis Date   • Anemia    • Anticoagulant long-term use    • Anxiety    • Aortic valve prosthesis present    • Arthritis    • Asthma    • Chronic cough    • Colon polyps    • COPD (chronic obstructive pulmonary disease)     followed by Dr. Kilgore   • Depression    • Diabetes    • GERD (gastroesophageal reflux disease)    • History of anticoagulant therapy     Chronic. Prosthetic Valve    • History of blood transfusion     Pt states she had reaction to blood that causes abnormal heart rhythm    • History of cholecystectomy    • Hypercholesteremia    • Hyperlipidemia    • Hypertension    • Panic attack    • PHT (portal hypertension)    • Sleep apnea        Social History     Social History   • Marital status:      Spouse name: N/A   • Number of children: 1   • Years of education: Associates Degree     Occupational History   • RETIRED      [employer] Worker      Social History Main Topics   • Smoking status: Current Every Day Smoker     Packs/day: 1.00     Years: 39.00     Types: Cigarettes   • Smokeless tobacco: Never Used   • Alcohol use No   • Drug use: No   • Sexual activity: Defer     Other Topics Concern   • Not on file     Social History Narrative   • No narrative on file       Family History   Problem Relation Age of Onset   • Coronary artery disease Mother    • Colon polyps Mother    • Colon cancer Mother    • Ulcers Mother    • Cancer Mother    • Cancer Father    • Diabetes Father    • Hypertension Brother    • Diabetes Brother    • Heart failure Maternal Grandmother    • Heart failure Maternal  "Grandfather    • Lung disease Paternal Grandfather        Review of Systems   Constitution: Positive for malaise/fatigue (not a new symptom). Negative for decreased appetite and fever.   HENT: Negative for congestion and hoarse voice.    Cardiovascular: Positive for leg swelling. Negative for chest pain, near-syncope and palpitations.   Respiratory: Positive for cough (\"smokers\") and shortness of breath. Negative for sputum production.    Endocrine: Negative for cold intolerance and heat intolerance.   Hematologic/Lymphatic: Negative for bleeding problem. Does not bruise/bleed easily.   Skin: Negative for dry skin and itching.   Musculoskeletal: Positive for joint pain (mild, not a new symptom). Negative for falls and muscle weakness.   Gastrointestinal: Negative for change in bowel habit, melena and nausea.   Genitourinary: Positive for nocturia. Negative for dysuria and hematuria.   Psychiatric/Behavioral: Negative for memory loss. The patient is not nervous/anxious.    Allergic/Immunologic: Negative for hives.        Objective     /58   Pulse 68   Ht 167.6 cm (66\")   Wt 106 kg (233 lb)   BMI 37.61 kg/m²     Physical Exam   Constitutional: She is oriented to person, place, and time. She appears well-nourished.   HENT:   Head: Normocephalic.   Eyes: Conjunctivae are normal. Pupils are equal, round, and reactive to light.   Neck: Normal range of motion. Neck supple. No JVD present. Carotid bruit is not present. Edema (ankles, mild) present.   Cardiovascular: Normal rate, regular rhythm, S1 normal and S2 normal.    Murmur heard.   Midsystolic murmur is present with a grade of 2/6  at the upper right sternal border  Pulmonary/Chest: Effort normal. She has wheezes (slight). She has no rales.   Abdominal: Soft. Bowel sounds are normal. There is no tenderness.   Musculoskeletal: Normal range of motion. She exhibits edema. She exhibits no tenderness.   Neurological: She is alert and oriented to person, place, " and time.   Skin: Skin is warm and dry. No rash noted. No pallor.   Psychiatric: She has a normal mood and affect. Her behavior is normal.   Vitals reviewed.     Procedures: none today      Assessment/Plan      Jolene was seen today for follow-up, med refill and medication changes.    Diagnoses and all orders for this visit:    S/P AVR    Cardiac murmur    Obesity (BMI 30-39.9)    Essential hypertension    Hypercholesteremia    Current use of long term anticoagulation    Smoker    Tobacco use disorder    The report of her most recent echo was reviewed which showed normal function of mechanical valve and normal LV ejection fraction. Her blood pressure today is normal. Advised to continue same cardiac medications. She follows with you for management of labs and medication refills.   Patient's Body mass index is 37.61 kg/m². BMI is above normal parameters. Recommendations include: nutrition counseling. Diabetic diet and diet for weight loss encouraged. Information given.   I advised the patient of the risks in continuing to use tobacco, and I provided this patient with smoking cessation educational materials. During this visit, I spent < 3 minutes counseling the patient regarding smoking cessation.  No further cardiac testing advised at this time. We will see her back in 6 months or sooner for problems.            Electronically signed by TANIA Trivedi,  May 9, 2018 4:27 PM

## 2020-09-14 ENCOUNTER — OFFICE VISIT (OUTPATIENT)
Dept: CARDIOLOGY | Facility: CLINIC | Age: 65
End: 2020-09-14

## 2020-09-14 VITALS
WEIGHT: 205 LBS | HEART RATE: 68 BPM | SYSTOLIC BLOOD PRESSURE: 124 MMHG | HEIGHT: 66 IN | DIASTOLIC BLOOD PRESSURE: 70 MMHG | TEMPERATURE: 98.9 F | BODY MASS INDEX: 32.95 KG/M2

## 2020-09-14 DIAGNOSIS — Z72.0 TOBACCO ABUSE: ICD-10-CM

## 2020-09-14 DIAGNOSIS — R06.02 SHORTNESS OF BREATH: Primary | ICD-10-CM

## 2020-09-14 DIAGNOSIS — Z95.2 S/P AVR: ICD-10-CM

## 2020-09-14 DIAGNOSIS — I77.1 SUBCLAVIAN ARTERIAL STENOSIS (HCC): ICD-10-CM

## 2020-09-14 DIAGNOSIS — E66.9 OBESITY (BMI 30-39.9): ICD-10-CM

## 2020-09-14 DIAGNOSIS — R09.89 BRUIT (ARTERIAL): ICD-10-CM

## 2020-09-14 DIAGNOSIS — I10 ESSENTIAL HYPERTENSION: ICD-10-CM

## 2020-09-14 DIAGNOSIS — J43.8 OTHER EMPHYSEMA (HCC): ICD-10-CM

## 2020-09-14 DIAGNOSIS — R01.1 HEART MURMUR: ICD-10-CM

## 2020-09-14 DIAGNOSIS — I65.23 SYMPTOMATIC STENOSIS OF BOTH CAROTID ARTERIES WITHOUT INFARCTION: ICD-10-CM

## 2020-09-14 DIAGNOSIS — I20.8 ANGINAL EQUIVALENT (HCC): ICD-10-CM

## 2020-09-14 PROCEDURE — 99214 OFFICE O/P EST MOD 30 MIN: CPT | Performed by: NURSE PRACTITIONER

## 2020-09-14 RX ORDER — BUSPIRONE HYDROCHLORIDE 10 MG/1
10 TABLET ORAL 2 TIMES DAILY
COMMUNITY
End: 2020-10-29

## 2020-09-14 RX ORDER — OMEPRAZOLE 20 MG/1
20 CAPSULE, DELAYED RELEASE ORAL DAILY
COMMUNITY

## 2020-09-14 NOTE — PROGRESS NOTES
Chief Complaint   Patient presents with   • Follow-up     For cardiac management. Patient is not on aspirin. PCP manages warfarin. States that she has had pain under left breast. States that she rarely has palpitations. States that PCP is trying to get her qualified for oxygen again. States that she was started on nebulizier and it has helped shortness of breath. States that last lab work was done a few months ago per PCP, not in chart. States that she will see PCP Thursday and probably have more done.   • Med Refill     PCP does medication refills. Brought medication list with visit.        Cardiac Complaints  dyspnea      Subjective   Jolene Boyer is a 65 y.o. female with COPD, DM, HTN, severe aortic stenosis with s/p AVR in 2007, a carotid US was done in 2015 that showed a small ALMA, then a CTA was performed at [facility] in January of 2016, while she was admitted for COPD exacerbation, that showed 90% ALMA and left subclavian stenosis. Dr. Lehman was consulted and decided since she had frequent admissions for COPD and pneumonia and continued to smoke 1ppd, it would be better to manage with antiplatelet therapy and smoking cessation. Later, she was at  for rectus sheath hematoma due to possible HIT with subtherapeutic PTT. She developed SVT,  given adenosine and metoprolol. She then went into atrial flutter with plans for cardizem drip, but converted to NSR. In December of 2016, a repeat echocardiogram showed aortic valve well seated and no other significant abnormalities. At her October 2017 visit she had more shortness of breath and chest tightness. An echocardiogram revealed good LV ejection fraction, well seated and normally functioning mechanical aortic valve. MR was mild and RVSP 24 mmHg.     Patient returns today for follow up and denies any new concerns. She continues to have issues with SOA and admits this has been similar to prior. She does report improvement in symptoms with nebulizer therapy.  She is  currently working on getting oxygen therapy approved again.  Patient continues to smoke despite concerns. Labs she admits remain followed by PCP and were done most recently last Thursday and are to be done again soon. PCP manages her PT/INR for coumadin and valve management. No refills needed as they are followed by PCP as well.         Cardiac History  Past Surgical History:   Procedure Laterality Date   • ABDOMINAL SURGERY Left 01/10/2016    Rectus Sheath Hematoma removed   • AORTIC VALVE REPAIR/REPLACEMENT  06/2007    AVR per Dr. Trent   • CARDIAC CATHETERIZATION  06/08/2007    Cath- Mod. AI, Normal coronaries   • CARDIOVASCULAR STRESS TEST  06/04/2007    Stress- 3min, 85% THR. BP- 190/96. R/O Apical Ischemia   • CARDIOVASCULAR STRESS TEST  05/13/2013    L. Myoview- ([facility]. Dr. Hernández) R/O Apical Ischemia   • COLONOSCOPY  2009   • COLONOSCOPY N/A 8/23/2016     COLONOSCOPY FOR SCREENING -  Surgeon: Meaghan Pham, DO:Hyperplastic polyp   • ECHO - CONVERTED  07/17/2007    Echo- EF >60%, AVELINO 1.5 cm2   • ECHO - CONVERTED  01/18/2010    Echo- EF >60%. AVELINO 1.3 cm2   • ECHO - CONVERTED  05/03/2013    Echo- (Dr. Alfred) EF 65%   • ECHO - CONVERTED  12/17/2014    Echo- EF 65%. AVELINO- 1.6 cm2   • ECHO - CONVERTED  12/14/2016    LVH, EF 60%, prosthethic aortic valve well seated, RVSP 43 mmHg   • ECHO - CONVERTED  10/16/2017    EF 65%. AVR. RVSP- 24 mmHg   • IR ANGIO EXT CAROTID CIRC UNILATERAL  12/2015    CTA:  severe stenosis to ALMA, tight stenosis of left subclavian at takeoff   • US CAROTID UNILATERAL  12/22/2015    Bilateral:  small ALMA, CTA recommended.       Current Outpatient Medications   Medication Sig Dispense Refill   • albuterol (PROVENTIL HFA;VENTOLIN HFA) 108 (90 Base) MCG/ACT inhaler Inhale 2 puffs Every 4 (Four) Hours As Needed for Wheezing.     • Albuterol Sulfate (PROAIR HFA IN) Inhale As Needed.     • atenolol (TENORMIN) 50 MG tablet Take 50 mg by mouth Daily.     • busPIRone (BUSPAR) 10 MG tablet Take  10 mg by mouth 2 (two) times a day.     • Fluticasone Furoate-Vilanterol (BREO ELLIPTA IN) Inhale Daily.     • furosemide (LASIX) 40 MG tablet Take 40 mg by mouth Daily.     • guaifenesin-dextromethorphan (MUCINEX DM)  MG tablet sustained-release 12 hour tablet Take  by mouth 2 (Two) Times a Day As Needed.     • lisinopril (PRINIVIL,ZESTRIL) 5 MG tablet Take 5 mg by mouth daily.     • metFORMIN (GLUCOPHAGE) 1000 MG tablet Take 1,000 mg by mouth Daily With Breakfast.     • nitroglycerin (NITROSTAT) 0.4 MG SL tablet 1 under the tongue as needed for angina, may repeat q5mins for up three doses 30 tablet 8   • omeprazole (priLOSEC) 20 MG capsule Take 20 mg by mouth Daily.     • simvastatin (ZOCOR) 40 MG tablet Take 40 mg by mouth every night.     • theophylline (MIGDALIA-24) 300 MG 24 hr capsule Take 300 mg by mouth Daily.     • Umeclidinium Bromide (INCRUSE ELLIPTA) 62.5 MCG/INH aerosol powder  Inhale.     • warfarin (COUMADIN) 10 MG tablet Take 10 mg by mouth daily. (PCP monitors)       No current facility-administered medications for this visit.        Risperdal [risperidone], Ipratropium bromide, Seroquel [quetiapine fumarate], and Zoloft [sertraline hcl]    Past Medical History:   Diagnosis Date   • Anemia    • Anticoagulant long-term use    • Anxiety    • Aortic valve prosthesis present    • Arthritis    • Asthma    • Chronic cough    • Colon polyps    • COPD (chronic obstructive pulmonary disease) (CMS/[facility])     followed by Dr. Kilgore   • Depression    • Diabetes (CMS/[facility])    • GERD (gastroesophageal reflux disease)    • History of anticoagulant therapy     Chronic. Prosthetic Valve    • History of blood transfusion     Pt states she had reaction to blood that causes abnormal heart rhythm    • History of cholecystectomy    • Hypercholesteremia    • Hyperlipidemia    • Hypertension    • Panic attack    • PHT (portal hypertension) (CMS/[facility])    • Sleep apnea        Social History     Socioeconomic History   •  "Marital status:      Spouse name: Not on file   • Number of children: 1   • Years of education: Associates Degree   • Highest education level: Not on file   Occupational History   • Occupation: RETIRED     Comment: [employer] Worker    Tobacco Use   • Smoking status: Current Every Day Smoker     Packs/day: 1.50     Years: 39.00     Pack years: 58.50     Types: Cigarettes   • Smokeless tobacco: Never Used   Substance and Sexual Activity   • Alcohol use: No   • Drug use: No   • Sexual activity: Defer       Family History   Problem Relation Age of Onset   • Coronary artery disease Mother    • Colon polyps Mother    • Colon cancer Mother    • Ulcers Mother    • Cancer Mother    • Cancer Father    • Diabetes Father    • Hypertension Brother    • Diabetes Brother    • Heart failure Maternal Grandmother    • Heart failure Maternal Grandfather    • Lung disease Paternal Grandfather        Review of Systems   Constitution: Negative for malaise/fatigue and night sweats.   Cardiovascular: Positive for dyspnea on exertion. Negative for chest pain, claudication, irregular heartbeat, leg swelling, near-syncope, orthopnea, palpitations and syncope.   Respiratory: Positive for shortness of breath. Negative for cough and wheezing.    Musculoskeletal: Positive for stiffness. Negative for back pain and joint pain.   Gastrointestinal: Negative for anorexia, heartburn, melena, nausea and vomiting.   Genitourinary: Negative for dysuria, hematuria, hesitancy and nocturia.   Neurological: Negative for dizziness, light-headedness and loss of balance.   Psychiatric/Behavioral: Negative for depression and memory loss. The patient is not nervous/anxious.            Objective     /70 (BP Location: Right arm)   Pulse 68   Temp 98.9 °F (37.2 °C)   Ht 167.6 cm (65.98\")   Wt 93 kg (205 lb)   BMI 33.10 kg/m²     Neck:      Musculoskeletal: Neck supple.      Vascular: Carotid bruit present.   Cardiovascular:      PMI at left " midclavicular line. Normal rate. Regular rhythm.      Murmurs: There is a harsh midsystolic murmur at the URSB, radiating to the neck.         Procedures    Assessment/Plan     Cardiac status:  SOA reported today and patient does have history of abnormal cardiac stress test. Repeat stress recommended to assess for any ischemia or LV dysfunction.    Murmur/ hx of AVR:  Murmur noted, no louder than prior. Last echo 2017 showed AVR stable.  Repeat echo recommended to reassess. He remains on coumadin therapy for anticoagulation.  PT/INR followed by your office. Bleeding and bruising denied.    HTN:  Blood pressure stable. No changes to current recommended. Patient to continue with current tenormin and lisinopril therapy.    Hx of subclavian stenosis:  Stenting in the past and bruit noted over right carotid bruit. Carotid US recommended to assess carotid arteries with more recommendations to follow.    Hyperlipidemia:  On statin therapy with zocor. FLP followed by your office. Can we have for review?    SOA/COPD:  Reported today and no worse than prior. She continues on neb therapy and is currently followed by pulmonary in regards. Patient trying to get oxygen therapy approved.    DM:  Taking glucophage therapy.  AIC followed by your office. Can we have for review?    Tobacco abuse:  Still smoking despite concerns. Smoking cessation discussed once again.  Patient not ready to quit at present.    BMI noted at 33.1, good cardiac ADA diet with limited carbs, calories, and activity as tolerated advised.    6 month follow up recommended or sooner if needed.    Refills per request.         Problems Addressed this Visit        Cardiovascular and Mediastinum    Essential hypertension       Respiratory    COPD (chronic obstructive pulmonary disease) (CMS/HCC)    Shortness of breath - Primary    Relevant Orders    Adult Transthoracic Echo Complete W/ Cont if Necessary Per Protocol    Stress Test With Myocardial Perfusion One Day          Digestive    Obesity (BMI 30-39.9)       Other    S/P AVR    Relevant Orders    Adult Transthoracic Echo Complete W/ Cont if Necessary Per Protocol      Other Visit Diagnoses     Heart murmur        Relevant Orders    Adult Transthoracic Echo Complete W/ Cont if Necessary Per Protocol    Anginal equivalent (CMS/HCC)        Relevant Orders    Stress Test With Myocardial Perfusion One Day    Bruit (arterial)        Relevant Orders    US carotid bilateral    Subclavian arterial stenosis (CMS/HCC)        Tobacco abuse              Patient's Body mass index is 33.1 kg/m². BMI is above normal parameters. Recommendations include: nutrition counseling.      Jolene Boyer  reports that she has been smoking cigarettes. She has a 58.50 pack-year smoking history. She has never used smokeless tobacco. Patient is not ready to quit smoking at present.              Electronically signed by TANIA Hernandez September 14, 2020 15:32 EDT

## 2020-09-24 ENCOUNTER — HOSPITAL ENCOUNTER (OUTPATIENT)
Dept: CARDIOLOGY | Facility: HOSPITAL | Age: 65
Discharge: HOME | End: 2020-09-24

## 2020-09-24 ENCOUNTER — TELEPHONE (OUTPATIENT)
Dept: CARDIOLOGY | Facility: CLINIC | Age: 65
End: 2020-09-24

## 2020-09-24 ENCOUNTER — APPOINTMENT (OUTPATIENT)
Dept: CARDIOLOGY | Facility: HOSPITAL | Age: 65
End: 2020-09-24

## 2020-09-24 DIAGNOSIS — R06.02 SHORTNESS OF BREATH: ICD-10-CM

## 2020-09-24 DIAGNOSIS — I65.23 SYMPTOMATIC STENOSIS OF BOTH CAROTID ARTERIES WITHOUT INFARCTION: Primary | ICD-10-CM

## 2020-09-24 DIAGNOSIS — I20.8 ANGINAL EQUIVALENT (HCC): ICD-10-CM

## 2020-09-24 DIAGNOSIS — R09.89 BRUIT (ARTERIAL): ICD-10-CM

## 2020-09-24 DIAGNOSIS — Z95.2 S/P AVR: ICD-10-CM

## 2020-09-24 DIAGNOSIS — R01.1 HEART MURMUR: ICD-10-CM

## 2020-09-24 LAB
BH CV STRESS COMMENTS STAGE 1: NORMAL
BH CV STRESS DOSE REGADENOSON STAGE 1: 0.4
BH CV STRESS DURATION MIN STAGE 1: 0
BH CV STRESS DURATION SEC STAGE 1: 10
BH CV STRESS PROTOCOL 1: NORMAL
BH CV STRESS RECOVERY BP: NORMAL MMHG
BH CV STRESS RECOVERY HR: 75 BPM
BH CV STRESS STAGE 1: 1
LV EF NUC BP: 66 %
MAXIMAL PREDICTED HEART RATE: 157 BPM
PERCENT MAX PREDICTED HR: 68.79 %
STRESS BASELINE BP: NORMAL MMHG
STRESS BASELINE HR: 60 BPM
STRESS PERCENT HR: 81 %
STRESS POST PEAK BP: NORMAL MMHG
STRESS POST PEAK HR: 108 BPM
STRESS TARGET HR: 133 BPM

## 2020-09-24 PROCEDURE — 93306 TTE W/DOPPLER COMPLETE: CPT | Performed by: INTERNAL MEDICINE

## 2020-09-24 PROCEDURE — 78452 HT MUSCLE IMAGE SPECT MULT: CPT

## 2020-09-24 PROCEDURE — A9500 TC99M SESTAMIBI: HCPCS | Performed by: INTERNAL MEDICINE

## 2020-09-24 PROCEDURE — 93306 TTE W/DOPPLER COMPLETE: CPT

## 2020-09-24 PROCEDURE — 0 TECHNETIUM SESTAMIBI: Performed by: INTERNAL MEDICINE

## 2020-09-24 PROCEDURE — 93880 EXTRACRANIAL BILAT STUDY: CPT | Performed by: RADIOLOGY

## 2020-09-24 PROCEDURE — 78452 HT MUSCLE IMAGE SPECT MULT: CPT | Performed by: INTERNAL MEDICINE

## 2020-09-24 PROCEDURE — 93018 CV STRESS TEST I&R ONLY: CPT | Performed by: INTERNAL MEDICINE

## 2020-09-24 PROCEDURE — 93017 CV STRESS TEST TRACING ONLY: CPT

## 2020-09-24 PROCEDURE — 93880 EXTRACRANIAL BILAT STUDY: CPT

## 2020-09-24 PROCEDURE — 93016 CV STRESS TEST SUPVJ ONLY: CPT | Performed by: NURSE PRACTITIONER

## 2020-09-24 PROCEDURE — 25010000002 REGADENOSON 0.4 MG/5ML SOLUTION: Performed by: INTERNAL MEDICINE

## 2020-09-24 RX ADMIN — TECHNETIUM TC 99M SESTAMIBI 1 DOSE: 1 INJECTION INTRAVENOUS at 08:36

## 2020-09-24 RX ADMIN — REGADENOSON 0.4 MG: 0.08 INJECTION, SOLUTION INTRAVENOUS at 08:36

## 2020-09-24 RX ADMIN — TECHNETIUM TC 99M SESTAMIBI 1 DOSE: 1 INJECTION INTRAVENOUS at 08:34

## 2020-09-26 LAB
BH CV ECHO MEAS - ACS: 1.5 CM
BH CV ECHO MEAS - AO MAX PG (FULL): 19.5 MMHG
BH CV ECHO MEAS - AO MAX PG: 23 MMHG
BH CV ECHO MEAS - AO MEAN PG (FULL): 16 MMHG
BH CV ECHO MEAS - AO MEAN PG: 18 MMHG
BH CV ECHO MEAS - AO ROOT AREA (BSA CORRECTED): 1.1
BH CV ECHO MEAS - AO ROOT AREA: 4 CM^2
BH CV ECHO MEAS - AO ROOT DIAM: 2.3 CM
BH CV ECHO MEAS - AO V2 MAX: 223.6 CM/SEC
BH CV ECHO MEAS - AO V2 MEAN: 200 CM/SEC
BH CV ECHO MEAS - AO V2 VTI: 67.8 CM
BH CV ECHO MEAS - AVA(I,A): 1.1 CM^2
BH CV ECHO MEAS - AVA(I,D): 1.6 CM^2
BH CV ECHO MEAS - AVA(V,A): 1.2 CM^2
BH CV ECHO MEAS - AVA(V,D): 1.2 CM^2
BH CV ECHO MEAS - BSA(HAYCOCK): 2.1 M^2
BH CV ECHO MEAS - BSA: 2 M^2
BH CV ECHO MEAS - BZI_BMI: 34.1 KILOGRAMS/M^2
BH CV ECHO MEAS - BZI_METRIC_HEIGHT: 165.1 CM
BH CV ECHO MEAS - BZI_METRIC_WEIGHT: 93 KG
BH CV ECHO MEAS - EDV(CUBED): 101.2 ML
BH CV ECHO MEAS - EDV(MOD-SP4): 71.8 ML
BH CV ECHO MEAS - EDV(TEICH): 100.3 ML
BH CV ECHO MEAS - EF(CUBED): 70 %
BH CV ECHO MEAS - EF(MOD-SP4): 48.6 %
BH CV ECHO MEAS - EF(TEICH): 61.6 %
BH CV ECHO MEAS - ESV(CUBED): 30.4 ML
BH CV ECHO MEAS - ESV(MOD-SP4): 36.9 ML
BH CV ECHO MEAS - ESV(TEICH): 38.5 ML
BH CV ECHO MEAS - FS: 33 %
BH CV ECHO MEAS - IVS/LVPW: 0.9
BH CV ECHO MEAS - IVSD: 0.99 CM
BH CV ECHO MEAS - LA DIMENSION: 3.8 CM
BH CV ECHO MEAS - LA/AO: 1.7
BH CV ECHO MEAS - LV DIASTOLIC VOL/BSA (35-75): 35.9 ML/M^2
BH CV ECHO MEAS - LV IVRT: 0.09 SEC
BH CV ECHO MEAS - LV MASS(C)D: 171.8 GRAMS
BH CV ECHO MEAS - LV MASS(C)DI: 86 GRAMS/M^2
BH CV ECHO MEAS - LV MAX PG: 3.3 MMHG
BH CV ECHO MEAS - LV MEAN PG: 2 MMHG
BH CV ECHO MEAS - LV SYSTOLIC VOL/BSA (12-30): 18.5 ML/M^2
BH CV ECHO MEAS - LV V1 MAX: 91.2 CM/SEC
BH CV ECHO MEAS - LV V1 MEAN: 69.7 CM/SEC
BH CV ECHO MEAS - LV V1 VTI: 26.4 CM
BH CV ECHO MEAS - LVIDD: 4.7 CM
BH CV ECHO MEAS - LVIDS: 3.1 CM
BH CV ECHO MEAS - LVLD AP4: 6.9 CM
BH CV ECHO MEAS - LVLS AP4: 6.3 CM
BH CV ECHO MEAS - LVOT AREA (M): 2.8 CM^2
BH CV ECHO MEAS - LVOT AREA: 2.8 CM^2
BH CV ECHO MEAS - LVOT DIAM: 2 CM
BH CV ECHO MEAS - LVPWD: 1.1 CM
BH CV ECHO MEAS - MV A MAX VEL: 103 CM/SEC
BH CV ECHO MEAS - MV DEC SLOPE: 543 CM/SEC^2
BH CV ECHO MEAS - MV E MAX VEL: 120 CM/SEC
BH CV ECHO MEAS - MV E/A: 1.2
BH CV ECHO MEAS - RVDD: 3 CM
BH CV ECHO MEAS - SI(AO): 134.9 ML/M^2
BH CV ECHO MEAS - SI(CUBED): 35.4 ML/M^2
BH CV ECHO MEAS - SI(LVOT): 37.4 ML/M^2
BH CV ECHO MEAS - SI(MOD-SP4): 17.5 ML/M^2
BH CV ECHO MEAS - SI(TEICH): 30.9 ML/M^2
BH CV ECHO MEAS - SV(AO): 269.6 ML
BH CV ECHO MEAS - SV(CUBED): 70.8 ML
BH CV ECHO MEAS - SV(LVOT): 74.9 ML
BH CV ECHO MEAS - SV(MOD-SP4): 34.9 ML
BH CV ECHO MEAS - SV(TEICH): 61.8 ML
MAXIMAL PREDICTED HEART RATE: 157 BPM
STRESS TARGET HR: 133 BPM

## 2020-10-14 ENCOUNTER — TELEPHONE (OUTPATIENT)
Dept: CARDIOLOGY | Facility: CLINIC | Age: 65
End: 2020-10-14

## 2020-10-14 DIAGNOSIS — I77.1 SUBCLAVIAN ARTERIAL STENOSIS (HCC): ICD-10-CM

## 2020-10-14 DIAGNOSIS — I65.23 SYMPTOMATIC STENOSIS OF BOTH CAROTID ARTERIES WITHOUT INFARCTION: Primary | ICD-10-CM

## 2020-10-29 ENCOUNTER — OFFICE VISIT (OUTPATIENT)
Dept: NEUROSURGERY | Facility: CLINIC | Age: 65
End: 2020-10-29

## 2020-10-29 VITALS
HEIGHT: 66 IN | WEIGHT: 200 LBS | DIASTOLIC BLOOD PRESSURE: 68 MMHG | TEMPERATURE: 97.3 F | BODY MASS INDEX: 32.14 KG/M2 | SYSTOLIC BLOOD PRESSURE: 112 MMHG

## 2020-10-29 DIAGNOSIS — I77.1 STENOSIS OF LEFT SUBCLAVIAN ARTERY (HCC): ICD-10-CM

## 2020-10-29 DIAGNOSIS — Z72.0 TOBACCO ABUSE: ICD-10-CM

## 2020-10-29 DIAGNOSIS — I65.23 ASYMPTOMATIC BILATERAL CAROTID ARTERY STENOSIS: Primary | ICD-10-CM

## 2020-10-29 PROCEDURE — 99203 OFFICE O/P NEW LOW 30 MIN: CPT | Performed by: NEUROLOGICAL SURGERY

## 2020-10-29 RX ORDER — ASPIRIN 81 MG/1
81 TABLET ORAL DAILY
Qty: 30 TABLET | Refills: 3 | Status: SHIPPED | OUTPATIENT
Start: 2020-10-29

## 2020-10-29 NOTE — PROGRESS NOTES
NAME: NEELIMA MARCELINO   DOS: 10/29/2020  : 1955  PCP: Simone Savage MD    Chief Complaint:    Chief Complaint   Patient presents with   • Bilateral carotid stenosis   • Left subclavian stenosis       History of Present Illness:  65 y.o. female   Is a 65-year-old female with a very complex history she had a blood transinfusion reaction with a Juan antibody she has a history of aortic stenosis status post aortic valve replacement she has high-grade multilevel asymptomatic stenosis of all arteries she reports some discomfort in her left upper extremity however some of the numbness goes to her fourth and fifth digits she denies any claudicatory symptoms in the face and denies any TIA or stroke she is on Coumadin she has had multiple hospitalization in the past but denies any TIA or stroke she smokes she is here for evaluation for asymptomatic high-grade stenosis    PMHX  Allergies:  Allergies   Allergen Reactions   • Risperdal [Risperidone] Anaphylaxis   • Ipratropium Bromide Other (See Comments)     Laryngitis   • Seroquel [Quetiapine Fumarate]    • Zoloft [Sertraline Hcl]      Medications    Current Outpatient Medications:   •  albuterol (PROVENTIL HFA;VENTOLIN HFA) 108 (90 Base) MCG/ACT inhaler, Inhale 2 puffs Every 4 (Four) Hours As Needed for Wheezing., Disp: , Rfl:   •  Albuterol Sulfate (PROAIR HFA IN), Inhale As Needed., Disp: , Rfl:   •  atenolol (TENORMIN) 50 MG tablet, Take 50 mg by mouth Daily., Disp: , Rfl:   •  Fluticasone Furoate-Vilanterol (BREO ELLIPTA IN), Inhale Daily., Disp: , Rfl:   •  furosemide (LASIX) 40 MG tablet, Take 40 mg by mouth Daily., Disp: , Rfl:   •  guaifenesin-dextromethorphan (MUCINEX DM)  MG tablet sustained-release 12 hour tablet, Take  by mouth 2 (Two) Times a Day As Needed., Disp: , Rfl:   •  lisinopril (PRINIVIL,ZESTRIL) 5 MG tablet, Take 5 mg by mouth daily., Disp: , Rfl:   •  metFORMIN (GLUCOPHAGE) 1000 MG tablet, Take 1,000 mg by mouth Daily With  Breakfast., Disp: , Rfl:   •  nitroglycerin (NITROSTAT) 0.4 MG SL tablet, 1 under the tongue as needed for angina, may repeat q5mins for up three doses, Disp: 30 tablet, Rfl: 8  •  omeprazole (priLOSEC) 20 MG capsule, Take 20 mg by mouth Daily., Disp: , Rfl:   •  simvastatin (ZOCOR) 40 MG tablet, Take 40 mg by mouth every night., Disp: , Rfl:   •  theophylline (MIGDALIA-24) 300 MG 24 hr capsule, Take 300 mg by mouth Daily., Disp: , Rfl:   •  Umeclidinium Bromide (INCRUSE ELLIPTA) 62.5 MCG/INH aerosol powder , Inhale., Disp: , Rfl:   •  warfarin (COUMADIN) 10 MG tablet, Take 10 mg by mouth daily. (PCP monitors), Disp: , Rfl:   Past Medical History:  Past Medical History:   Diagnosis Date   • Anemia    • Anticoagulant long-term use    • Anxiety    • Aortic valve prosthesis present    • Arthritis    • Asthma    • Chronic cough    • Claustrophobia    • Colon polyps    • COPD (chronic obstructive pulmonary disease) (CMS/HCC)     followed by Dr. Kilgore   • Depression    • Diabetes (CMS/[facility])    • GERD (gastroesophageal reflux disease)    • History of anticoagulant therapy     Chronic. Prosthetic Valve    • History of blood transfusion     Pt states she had reaction to blood that causes abnormal heart rhythm    • History of cholecystectomy    • Hypercholesteremia    • Hyperlipidemia    • Hypertension    • [facility]    • Panic attack    • PHT (portal hypertension) (CMS/[facility])    • Rectus sheath hematoma left   • Sleep apnea      Past Surgical History:  Past Surgical History:   Procedure Laterality Date   • ABDOMINAL SURGERY Left 01/10/2016    Rectus Sheath Hematoma removed   • AORTIC VALVE REPAIR/REPLACEMENT  06/2007    AVR per Dr. Trent   • CARDIAC CATHETERIZATION  06/08/2007    Cath- Mod. AI, Normal coronaries   • CARDIOVASCULAR STRESS TEST  06/04/2007    Stress- 3min, 85% THR. BP- 190/96. R/O Apical Ischemia   • CARDIOVASCULAR STRESS TEST  05/13/2013    L. Myoview- ([facility]. Dr. Hernández)  R/O Apical Ischemia   • CARDIOVASCULAR STRESS TEST  09/24/2020    Lexiscan- EF 66%. Negative.   • CHOLECYSTECTOMY  06/2015   • COLONOSCOPY  2009   • COLONOSCOPY N/A 8/23/2016     COLONOSCOPY FOR SCREENING -  Surgeon: Meaghan Pham, DO:Hyperplastic polyp   • ECHO - CONVERTED  07/17/2007    Echo- EF >60%, AVELINO 1.5 cm2   • ECHO - CONVERTED  01/18/2010    Echo- EF >60%. AVELINO 1.3 cm2   • ECHO - CONVERTED  05/03/2013    Echo- (Dr. Alfred) EF 65%   • ECHO - CONVERTED  12/17/2014    Echo- EF 65%. AVELINO- 1.6 cm2   • ECHO - CONVERTED  12/14/2016    LVH, EF 60%, prosthethic aortic valve well seated, RVSP 43 mmHg   • ECHO - CONVERTED  10/16/2017    EF 65%. AVR. RVSP- 24 mmHg   • ECHO - CONVERTED  09/24/2020    EF 60%. AVR. AVELINO- 1.6 Cm2. 18/23 mmHg. Trace MR   • IR ANGIO EXT CAROTID CIRC UNILATERAL  12/2015    CTA:  severe stenosis to ALMA, tight stenosis of left subclavian at takeoff   • KNEE SURGERY      Dr. Freeman 1979 & 1980   • TONSILLECTOMY  1973   • TUBAL ABDOMINAL LIGATION  1990   • US CAROTID UNILATERAL  12/22/2015    Bilateral:  small ALMA, CTA recommended.     Social Hx:  Social History     Tobacco Use   • Smoking status: Current Every Day Smoker     Packs/day: 1.50     Years: 39.00     Pack years: 58.50     Types: Cigarettes   • Smokeless tobacco: Never Used   Substance Use Topics   • Alcohol use: No   • Drug use: No     Family Hx:  Family History   Problem Relation Age of Onset   • Coronary artery disease Mother    • Colon polyps Mother    • Colon cancer Mother    • Ulcers Mother    • Cancer Mother    • Cancer Father    • Diabetes Father    • Hypertension Brother    • Diabetes Brother    • Heart failure Maternal Grandmother    • Heart failure Maternal Grandfather    • Lung disease Paternal Grandfather      Review of Systems:        Review of Systems   Constitutional: Positive for fatigue. Negative for activity change, appetite change, chills, diaphoresis, fever and unexpected weight change.   HENT: Positive for  congestion, dental problem, ear pain, postnasal drip, rhinorrhea and voice change. Negative for drooling, ear discharge, facial swelling, hearing loss, mouth sores, nosebleeds, sinus pressure, sinus pain, sneezing, sore throat, tinnitus and trouble swallowing.    Eyes: Negative for photophobia, pain, discharge, redness, itching and visual disturbance.   Respiratory: Positive for apnea, cough, chest tightness, shortness of breath and wheezing. Negative for choking and stridor.    Cardiovascular: Positive for palpitations and leg swelling. Negative for chest pain.   Gastrointestinal: Positive for abdominal pain. Negative for abdominal distention, anal bleeding, blood in stool, constipation, diarrhea, nausea, rectal pain and vomiting.   Endocrine: Negative for cold intolerance, heat intolerance, polydipsia, polyphagia and polyuria.   Genitourinary: Positive for genital sores. Negative for decreased urine volume, difficulty urinating, dyspareunia, dysuria, enuresis, flank pain, frequency, hematuria, menstrual problem, pelvic pain, urgency, vaginal bleeding, vaginal discharge and vaginal pain.   Musculoskeletal: Positive for arthralgias, back pain, gait problem, myalgias, neck pain and neck stiffness. Negative for joint swelling.   Skin: Negative for color change, pallor, rash and wound.   Allergic/Immunologic: Negative for environmental allergies, food allergies and immunocompromised state.   Neurological: Positive for facial asymmetry and weakness. Negative for dizziness, tremors, seizures, syncope, speech difficulty, light-headedness, numbness and headaches.   Hematological: Positive for adenopathy. Bruises/bleeds easily.   Psychiatric/Behavioral: Positive for sleep disturbance. Negative for agitation, behavioral problems, confusion, decreased concentration, dysphoric mood, hallucinations, self-injury and suicidal ideas. The patient is nervous/anxious. The patient is not hyperactive.       Review of systems negative  for stroke or TIA      Physical Examination:  Vitals:    10/29/20 1216   BP: 112/68   Temp:       General Appearance:   Well developed, well nourished, well groomed, alert, and cooperative.  Neurological examination:  Neurologic Exam  She is awake alert and appears with mild respiratory distress that is her baseline she is short of breath and rhonchorous    Her pupils are symmetric extraocular movements  Vital signs were reviewed and documented in the chart  Patient appeared in good neurologic function with normal comprehension fluent speech  Mood and affect are normal  Sense of smell deferred    Pupils symmetric equally reactive funduscopic exam not visualized   Visual fields intact to confrontation  Extraocular movements intact  Face motor function is symmetric  Facial sensations normal  Hearing intact to finger rub hearing intact to finger rub  Tongue is midline  Palate symmetric  Swallowing normal  Shoulder shrug normal  She has truncal obesity with bruising from her Coumadin    Her femoral pulse on the right is palpable under her pannus    She has good arterial pulses bilaterally with bruits bilaterally    Lungs are rhonchorous    She has holosystolic murmurs at the bilateral carotid and subclavian  Positive Tinel's at the ulnar and wrist for carpal tunnel      Review of Imaging/DATA:  I reviewed her imaging her peak systolic velocities are in excess of 400/100 on the right indicative of probable 80% stenosis on the right that is confirmed on her CTA there is no parvus waveform      Diagnoses/Plan:    Ms. Boyer is a 65 y.o. female   Complex medical patient with currently ASYMPTOMATIC high-grade stenosis of the right-sided carotid artery with marginal stenosis probably 60% or so on the left carotid diffuse calcifications aortic arch high surgical risk features including orthopnea when I examined her in the examination room diabetes ongoing tobacco and anticoagulation therapy.  Additionally she has bilateral  arterial pressures that are equal in her upper extremities without evidence of high-grade stenosis of the subclavian's based on a perfusion findings in the arms, I suspect she has ulnar neuropathy and carpal tunnel bilaterally.  I had a yony discussion with her about stroke risk prevention, medical procedural complication and explained that her overall 10-year stroke risk based on her medical history with ongoing tobacco use is in excess of approximately 50% mostly for microvascular disease there is probably a 5 to 6% chance of having a stroke from her asymptomatic high-grade stenosis (we are enrolling these patients in the crest trial she is not a candidate secondary to anticoagulation ) and her carotid has high surgical risk features for stenting including calcification etc. I favored observational therapy she is in agreement I counseled her about smoking cessation we will see her back in 6 months with a repeat ultrasound I explained the signs and symptoms to look for to necessitate a referral back she has had no evidence of TIA or stroke    We have added baby aspirin to her regimen I will see her back in 6 months with a repeat ultrasound of her carotids vertebrals and her subclavian's and if there is progressive stenosis or velocity involvement will contemplate intervention

## 2020-11-11 ENCOUNTER — OUTSIDE FACILITY SERVICE (OUTPATIENT)
Dept: CARDIOLOGY | Facility: CLINIC | Age: 65
End: 2020-11-11

## 2020-11-11 DIAGNOSIS — Z00.6 EXAMINATION FOR NORMAL COMPARISON FOR CLINICAL RESEARCH: Primary | ICD-10-CM

## 2020-11-11 PROCEDURE — 93321 DOPPLER ECHO F-UP/LMTD STD: CPT | Performed by: INTERNAL MEDICINE

## 2020-11-11 PROCEDURE — 93325 DOPPLER ECHO COLOR FLOW MAPG: CPT | Performed by: INTERNAL MEDICINE

## 2020-11-11 PROCEDURE — 99215 OFFICE O/P EST HI 40 MIN: CPT | Performed by: INTERNAL MEDICINE

## 2020-11-11 PROCEDURE — 93308 TTE F-UP OR LMTD: CPT | Performed by: INTERNAL MEDICINE

## 2020-11-12 ENCOUNTER — OUTSIDE FACILITY SERVICE (OUTPATIENT)
Dept: CARDIOLOGY | Facility: CLINIC | Age: 65
End: 2020-11-12

## 2020-11-12 PROCEDURE — 99214 OFFICE O/P EST MOD 30 MIN: CPT | Performed by: INTERNAL MEDICINE

## 2020-11-13 ENCOUNTER — TELEPHONE (OUTPATIENT)
Dept: CARDIOLOGY | Facility: CLINIC | Age: 65
End: 2020-11-13

## 2020-11-19 ENCOUNTER — CLINICAL SUPPORT (OUTPATIENT)
Dept: CARDIOLOGY | Facility: CLINIC | Age: 65
End: 2020-11-19

## 2020-11-19 ENCOUNTER — TELEPHONE (OUTPATIENT)
Dept: CARDIOLOGY | Facility: CLINIC | Age: 65
End: 2020-11-19

## 2020-11-19 DIAGNOSIS — R00.2 PALPITATIONS: Primary | ICD-10-CM

## 2020-11-19 DIAGNOSIS — I47.1 PSVT (PAROXYSMAL SUPRAVENTRICULAR TACHYCARDIA) (HCC): ICD-10-CM

## 2020-11-19 PROCEDURE — 93000 ELECTROCARDIOGRAM COMPLETE: CPT | Performed by: INTERNAL MEDICINE

## 2020-11-19 NOTE — TELEPHONE ENCOUNTER
Patient in for EKG today, per Dr Silver continue same medications. Patient made aware to continue same medications, patient verbalized understanding.

## 2020-11-19 NOTE — PROGRESS NOTES
Procedure     ECG 12 Lead    Date/Time: 11/19/2020 11:49 AM  Performed by: Allie Silver MD  Authorized by: Allie Silver MD   Comparison: compared with previous ECG from 6/21/2016  Rhythm: sinus bradycardia  Rate: bradycardic  QRS axis: normal    Clinical impression: non-specific ECG

## 2021-02-25 DIAGNOSIS — Z23 IMMUNIZATION DUE: ICD-10-CM

## 2021-03-23 ENCOUNTER — OFFICE VISIT (OUTPATIENT)
Dept: CARDIOLOGY | Facility: CLINIC | Age: 66
End: 2021-03-23

## 2021-03-23 VITALS
HEIGHT: 66 IN | BODY MASS INDEX: 32.62 KG/M2 | SYSTOLIC BLOOD PRESSURE: 116 MMHG | DIASTOLIC BLOOD PRESSURE: 50 MMHG | WEIGHT: 203 LBS | TEMPERATURE: 97.5 F | HEART RATE: 73 BPM

## 2021-03-23 DIAGNOSIS — I47.1 PSVT (PAROXYSMAL SUPRAVENTRICULAR TACHYCARDIA) (HCC): Primary | ICD-10-CM

## 2021-03-23 DIAGNOSIS — R06.02 SHORTNESS OF BREATH: ICD-10-CM

## 2021-03-23 DIAGNOSIS — Z95.2 S/P AVR: ICD-10-CM

## 2021-03-23 DIAGNOSIS — Z72.0 TOBACCO ABUSE: ICD-10-CM

## 2021-03-23 DIAGNOSIS — R00.2 PALPITATIONS: ICD-10-CM

## 2021-03-23 DIAGNOSIS — E66.9 OBESITY (BMI 30-39.9): ICD-10-CM

## 2021-03-23 DIAGNOSIS — I10 ESSENTIAL HYPERTENSION: ICD-10-CM

## 2021-03-23 DIAGNOSIS — J43.8 OTHER EMPHYSEMA (HCC): ICD-10-CM

## 2021-03-23 DIAGNOSIS — I20.8 ANGINAL EQUIVALENT (HCC): ICD-10-CM

## 2021-03-23 DIAGNOSIS — I77.1 STENOSIS OF LEFT SUBCLAVIAN ARTERY (HCC): ICD-10-CM

## 2021-03-23 PROCEDURE — 99214 OFFICE O/P EST MOD 30 MIN: CPT | Performed by: NURSE PRACTITIONER

## 2021-03-23 PROCEDURE — 93000 ELECTROCARDIOGRAM COMPLETE: CPT | Performed by: NURSE PRACTITIONER

## 2021-03-23 RX ORDER — PRAMIPEXOLE DIHYDROCHLORIDE 0.25 MG/1
0.25 TABLET ORAL DAILY
COMMUNITY

## 2021-03-23 RX ORDER — SOTALOL HYDROCHLORIDE 80 MG/1
80 TABLET ORAL 2 TIMES DAILY
Qty: 180 TABLET | Refills: 2 | Status: SHIPPED | OUTPATIENT
Start: 2021-03-23

## 2021-03-23 RX ORDER — SOTALOL HYDROCHLORIDE 80 MG/1
80 TABLET ORAL 2 TIMES DAILY
COMMUNITY
End: 2021-03-23 | Stop reason: SDUPTHER

## 2021-03-23 RX ORDER — LISINOPRIL 5 MG/1
5 TABLET ORAL DAILY
Qty: 90 TABLET | Refills: 2 | Status: SHIPPED | OUTPATIENT
Start: 2021-03-23

## 2021-03-23 RX ORDER — FUROSEMIDE 40 MG/1
40 TABLET ORAL DAILY
Qty: 90 TABLET | Refills: 2 | Status: SHIPPED | OUTPATIENT
Start: 2021-03-23

## 2021-03-23 RX ORDER — FERROUS SULFATE 325(65) MG
325 TABLET ORAL
COMMUNITY

## 2021-03-23 RX ORDER — SIMVASTATIN 40 MG
40 TABLET ORAL NIGHTLY
Qty: 90 TABLET | Refills: 2 | Status: SHIPPED | OUTPATIENT
Start: 2021-03-23 | End: 2021-05-07 | Stop reason: DRUGHIGH

## 2021-03-23 NOTE — PROGRESS NOTES
Chief Complaint   Patient presents with   • Follow-up     cardiac management. pt is on daily aspirin. States she had ER visit 2/9/21 due to SOA at [facility]. will obtain records.   • Med Refill     will need cardiac meds refilled 90 days to [facility] in [place].   • Labs     last labs couple weeks ago with PCP, not in chart.   • Palpitations     last episode 3-4 nights ago when her SOA was worse than normal.       Cardiac Complaints  dyspnea and palpitations      Subjective   Jolene Boyer is a 65 y.o. female with COPD, DM, HTN, severe aortic stenosis with s/p AVR in 2007, a carotid US was done in 2015 that showed a small ALMA, then a CTA was performed at [facility] in January of 2016, while she was admitted for COPD exacerbation, that showed 90% ALMA and left subclavian stenosis. Dr. Lehman was consulted and decided since she had frequent admissions for COPD and pneumonia and continued to smoke 1ppd, it would be better to manage with antiplatelet therapy and smoking cessation. Later, she was at  for rectus sheath hematoma due to possible HIT with subtherapeutic PTT. She developed SVT,  given adenosine and metoprolol. She then went into atrial flutter with plans for cardizem drip, but converted to NSR. In December of 2016, a repeat echocardiogram showed aortic valve well seated and no other significant abnormalities. At her October 2017 visit she had more shortness of breath and chest tightness. An echocardiogram revealed good LV ejection fraction, well seated and normally functioning mechanical aortic valve. MR was mild and RVSP 24 mmHg.   Echo was repeated in 2020 that showed AVELINO stable at 1.6cm2 with stable RVSP. Carotid US 2020 showed significant left carotid stenosis. CTA was advised and completed on 9/24/2020 that showed significant left carotid stenosis and left subclavian stenosis, referral to Dr. Santos was made. She was seen by specialty that decided observational management best as TIA denied and equal  pulses in both arms noted.     She returns today after hospitalization from November 2020 for SOA and palpitations. It appears at that time, beta blocker was changed to sotalol as it was felt some of the symptoms were SVT related. Echo was recommended which showed EF stable at 60%, diastolic dysfunction, and mild to mod AS. Patient does report continued SOA, but nothing any worse than before. She denies any chest pain, palpitations, dizziness, and syncope.  Labs from hospital 11/2020:  HH 14.7/46, Na 137, K 4.3, , BUN 19, Creatinine 1.0, MAG 1.9, TRIG 134, HDL 30, LDL 61, TSH 3.89, AIC 5.6%.        Cardiac History  Past Surgical History:   Procedure Laterality Date   • ABDOMINAL SURGERY Left 01/10/2016    Rectus Sheath Hematoma removed   • AORTIC VALVE REPAIR/REPLACEMENT  06/2007    AVR per Dr. Trent   • CARDIAC CATHETERIZATION  06/08/2007    Cath- Mod. AI, Normal coronaries   • CARDIOVASCULAR STRESS TEST  06/04/2007    Stress- 3min, 85% THR. BP- 190/96. R/O Apical Ischemia   • CARDIOVASCULAR STRESS TEST  05/13/2013    L. Myoview- ([facility]. Dr. Hernández) R/O Apical Ischemia   • CARDIOVASCULAR STRESS TEST  09/24/2020    Lexiscan- EF 66%. Negative.   • CHOLECYSTECTOMY  06/2015   • COLONOSCOPY  2009   • COLONOSCOPY N/A 8/23/2016     COLONOSCOPY FOR SCREENING -  Surgeon: Meaghan Pham, DO:Hyperplastic polyp   • ECHO - CONVERTED  07/17/2007    Echo- EF >60%, AVELINO 1.5 cm2   • ECHO - CONVERTED  01/18/2010    Echo- EF >60%. AVELINO 1.3 cm2   • ECHO - CONVERTED  05/03/2013    Echo- (Dr. Alfred) EF 65%   • ECHO - CONVERTED  12/17/2014    Echo- EF 65%. AVELINO- 1.6 cm2   • ECHO - CONVERTED  12/14/2016    LVH, EF 60%, prosthethic aortic valve well seated, RVSP 43 mmHg   • ECHO - CONVERTED  10/16/2017    EF 65%. AVR. RVSP- 24 mmHg   • ECHO - CONVERTED  09/24/2020    EF 60%. AVR. AVELINO- 1.6 Cm2. 18/23 mmHg. Trace MR   • ECHO - CONVERTED  11/11/2020    EF 60%, mild MR, diastolic dysfunction   • IR ANGIO EXT CAROTID CIRC UNILATERAL   12/2015    CTA:  severe stenosis to ALMA, tight stenosis of left subclavian at takeoff   • KNEE SURGERY      Dr. Freeman 1979 & 1980   • TONSILLECTOMY  1973   • TUBAL ABDOMINAL LIGATION  1990   • US CAROTID UNILATERAL  12/22/2015    Bilateral:  small ALMA, CTA recommended.       Current Outpatient Medications   Medication Sig Dispense Refill   • albuterol (PROVENTIL HFA;VENTOLIN HFA) 108 (90 Base) MCG/ACT inhaler Inhale 2 puffs Every 4 (Four) Hours As Needed for Wheezing.     • Albuterol Sulfate (PROAIR HFA IN) Inhale As Needed.     • aspirin (aspirin) 81 MG EC tablet Take 1 tablet by mouth Daily. 30 tablet 3   • ferrous sulfate 325 (65 FE) MG tablet Take 325 mg by mouth Daily With Breakfast.     • Fluticasone Furoate-Vilanterol (BREO ELLIPTA IN) Inhale Daily.     • furosemide (LASIX) 40 MG tablet Take 1 tablet by mouth Daily. 90 tablet 2   • guaifenesin-dextromethorphan (MUCINEX DM)  MG tablet sustained-release 12 hour tablet Take  by mouth 2 (Two) Times a Day As Needed.     • lisinopril (PRINIVIL,ZESTRIL) 5 MG tablet Take 1 tablet by mouth Daily. 90 tablet 2   • metFORMIN (GLUCOPHAGE) 1000 MG tablet Take 1,000 mg by mouth Daily With Breakfast.     • nitroglycerin (NITROSTAT) 0.4 MG SL tablet 1 under the tongue as needed for angina, may repeat q5mins for up three doses 30 tablet 8   • omeprazole (priLOSEC) 20 MG capsule Take 20 mg by mouth Daily.     • pramipexole (MIRAPEX) 0.25 MG tablet Take 0.25 mg by mouth Daily.     • simvastatin (Zocor) 40 MG tablet Take 1 tablet by mouth Every Night. 90 tablet 2   • sotalol (BETAPACE) 80 MG tablet Take 1 tablet by mouth 2 (Two) Times a Day. 180 tablet 2   • theophylline (MIGDALIA-24) 300 MG 24 hr capsule Take 300 mg by mouth Daily.     • Umeclidinium Bromide (INCRUSE ELLIPTA) 62.5 MCG/INH aerosol powder  Inhale.     • warfarin (COUMADIN) 10 MG tablet Take 10 mg by mouth daily. (PCP monitors)       No current facility-administered medications for this visit.       Risperdal  [risperidone], Ipratropium bromide, Seroquel [quetiapine fumarate], and Zoloft [sertraline hcl]    Past Medical History:   Diagnosis Date   • Anemia    • Anticoagulant long-term use    • Anxiety    • Aortic valve prosthesis present    • Arthritis    • Asthma    • Chronic cough    • Claustrophobia    • Colon polyps    • COPD (chronic obstructive pulmonary disease) (CMS/[facility])     followed by Dr. Kilgore   • Depression    • Diabetes (CMS/[facility])    • GERD (gastroesophageal reflux disease)    • History of anticoagulant therapy     Chronic. Prosthetic Valve    • History of blood transfusion     Pt states she had reaction to blood that causes abnormal heart rhythm    • History of cholecystectomy    • Hypercholesteremia    • Hyperlipidemia    • Hypertension    • [facility]    • Panic attack    • PHT (portal hypertension) (CMS/[facility])    • Rectus sheath hematoma left   • Sleep apnea        Social History     Socioeconomic History   • Marital status:      Spouse name: Not on file   • Number of children: 1   • Years of education: Associates Degree   • Highest education level: Not on file   Tobacco Use   • Smoking status: Current Every Day Smoker     Packs/day: 1.50     Years: 39.00     Pack years: 58.50     Types: Cigarettes   • Smokeless tobacco: Never Used   Vaping Use   • Vaping Use: Never used   Substance and Sexual Activity   • Alcohol use: No   • Drug use: No   • Sexual activity: Defer       Family History   Problem Relation Age of Onset   • Coronary artery disease Mother    • Colon polyps Mother    • Colon cancer Mother    • Ulcers Mother    • Cancer Mother    • Cancer Father    • Diabetes Father    • Hypertension Brother    • Diabetes Brother    • Heart failure Maternal Grandmother    • Heart failure Maternal Grandfather    • Lung disease Paternal Grandfather        Review of Systems   Constitutional: Negative for malaise/fatigue and night sweats.   Cardiovascular:  "Positive for dyspnea on exertion. Negative for chest pain, claudication, irregular heartbeat, leg swelling, near-syncope, orthopnea, palpitations and syncope.   Respiratory: Positive for shortness of breath. Negative for cough and wheezing.    Musculoskeletal: Positive for stiffness. Negative for back pain and joint pain.   Gastrointestinal: Negative for anorexia, heartburn, melena, nausea and vomiting.   Genitourinary: Negative for dysuria, hematuria, hesitancy and nocturia.   Neurological: Negative for dizziness, light-headedness and loss of balance.   Psychiatric/Behavioral: Negative for depression and memory loss. The patient is not nervous/anxious.            Objective     /50 (BP Location: Right arm)   Pulse 73   Temp 97.5 °F (36.4 °C)   Ht 167.6 cm (65.98\")   Wt 92.1 kg (203 lb)   BMI 32.78 kg/m²     Constitutional:       Appearance: Healthy appearance. Not in distress.   Eyes:      Pupils: Pupils are equal, round, and reactive to light.   HENT:      Nose: Nose normal.   Pulmonary:      Effort: Pulmonary effort is normal.      Breath sounds: Wheezing present. Rhonchi present.   Cardiovascular:      PMI at left midclavicular line. Normal rate.      Murmurs: There is a systolic murmur.   Abdominal:      Palpations: Abdomen is soft.   Musculoskeletal: Normal range of motion.      Cervical back: Normal range of motion and neck supple. Skin:     General: Skin is warm and dry.   Neurological:      Mental Status: Oriented to person, place and time.           ECG 12 Lead    Date/Time: 3/23/2021 2:12 PM  Performed by: Fang Chavez APRN  Authorized by: Fang Chavez APRN   Comparison: compared with previous ECG from 11/19/2020  Comparison to previous ECG: Sinus ani  Rhythm: sinus rhythm  BPM: 73    Clinical impression: non-specific ECG  Comments: Normal QT            Assessment/Plan     Cardiac status:  SOA reported today, no worse than prior.  Most recent stress showed no ischemic burden. She " will continue with current ASA therapy     Murmur/ hx of AVR:  Murmur noted, no louder than prior. Most recent echo from [facility] 2021 showed AVELINO stable as well as RVSP. She remains on coumadin therapy for anticoagulation bleeding denied.  PT/INR followed by your office.     PSVT:  Now on sotalol therapy.  Palpitations reported as rare. EKG done today in regards shows a NSR with normal QT, same to be continued.  Limited caffeine advised.    SOA:   Reported same as prior. She continues on inhaler/neb therapy, followed by pulmonary.  Still smoking despite concerns, RVSP stable.      HTN:  Blood pressure stable. No changes to current recommended. Patient to continue with current sotalol and lisinopril therapy.     Hx of subclavian stenosis:  Now followed by Dr. Santos, he has continued with conservative management. HTN management, cholesterol control, and ASA continued.     Hyperlipidemia:  On statin therapy with zocor. FLP followed by your office. Can we have for review?     SOA/COPD:  Reported today and no worse than prior. She continues on neb therapy and is currently followed by pulmonary in regards.     DM:  Taking glucophage therapy.  AIC followed by your office. Can we have for review?     Tobacco abuse:  Still smoking despite concerns. Smoking cessation discussed once again.  Patient not ready to quit at present.     BMI noted at 32.78, good cardiac ADA diet with limited carbs, calories, and activity as tolerated advised.     6 month follow up recommended or sooner if needed.     Refills per request.        Problems Addressed this Visit        Cardiac and Vasculature    S/P AVR    Essential hypertension    Relevant Medications    lisinopril (PRINIVIL,ZESTRIL) 5 MG tablet    sotalol (BETAPACE) 80 MG tablet    furosemide (LASIX) 40 MG tablet    PSVT (paroxysmal supraventricular tachycardia) (CMS/HCC) - Primary    Relevant Medications    sotalol (BETAPACE) 80 MG tablet    Other Relevant Orders    ECG 12 Lead  (Completed)    Palpitations    Stenosis of left subclavian artery (CMS/[facility])    Anginal equivalent (CMS/[facility])    Relevant Medications    sotalol (BETAPACE) 80 MG tablet       Endocrine and Metabolic    Obesity (BMI 30-39.9)       Pulmonary and Pneumonias    COPD (chronic obstructive pulmonary disease) (CMS/[facility])    Shortness of breath      Diagnoses       Codes Comments    PSVT (paroxysmal supraventricular tachycardia) (CMS/[facility])    -  Primary ICD-10-CM: I47.1  ICD-9-CM: 427.0     Palpitations     ICD-10-CM: R00.2  ICD-9-CM: 785.1     S/P AVR     ICD-10-CM: Z95.2  ICD-9-CM: V43.3     Essential hypertension     ICD-10-CM: I10  ICD-9-CM: 401.9     Shortness of breath     ICD-10-CM: R06.02  ICD-9-CM: 786.05     Other emphysema (CMS/[facility])     ICD-10-CM: J43.8  ICD-9-CM: 492.8     Stenosis of left subclavian artery (CMS/[facility])     ICD-10-CM: I77.1  ICD-9-CM: 447.1     Anginal equivalent (CMS/[facility])     ICD-10-CM: I20.8  ICD-9-CM: 413.9     Obesity (BMI 30-39.9)     ICD-10-CM: E66.9  ICD-9-CM: 278.00           Patient's Body mass index is 32.78 kg/m². BMI is above normal parameters. Recommendations include: nutrition counseling.                   Electronically signed by TANIA Hernandez March 23, 2021 15:38 EDT

## 2021-04-26 ENCOUNTER — HOSPITAL ENCOUNTER (OUTPATIENT)
Dept: CARDIOLOGY | Facility: HOSPITAL | Age: 66
Discharge: HOME | End: 2021-04-26
Admitting: NEUROLOGICAL SURGERY

## 2021-04-26 DIAGNOSIS — I65.23 ASYMPTOMATIC BILATERAL CAROTID ARTERY STENOSIS: ICD-10-CM

## 2021-04-26 DIAGNOSIS — I77.1 STENOSIS OF LEFT SUBCLAVIAN ARTERY (HCC): ICD-10-CM

## 2021-04-26 PROCEDURE — 93880 EXTRACRANIAL BILAT STUDY: CPT

## 2021-04-26 PROCEDURE — 93880 EXTRACRANIAL BILAT STUDY: CPT | Performed by: RADIOLOGY

## 2021-04-29 ENCOUNTER — OFFICE VISIT (OUTPATIENT)
Dept: NEUROSURGERY | Facility: CLINIC | Age: 66
End: 2021-04-29

## 2021-04-29 VITALS — WEIGHT: 203 LBS | HEIGHT: 66 IN | BODY MASS INDEX: 32.62 KG/M2

## 2021-04-29 DIAGNOSIS — I65.23 ASYMPTOMATIC BILATERAL CAROTID ARTERY STENOSIS: ICD-10-CM

## 2021-04-29 DIAGNOSIS — I77.1 STENOSIS OF LEFT SUBCLAVIAN ARTERY (HCC): Primary | ICD-10-CM

## 2021-04-29 PROCEDURE — 99213 OFFICE O/P EST LOW 20 MIN: CPT | Performed by: NEUROLOGICAL SURGERY

## 2021-04-29 NOTE — PROGRESS NOTES
You have chosen to receive care through a telephone visit. Do you consent to use a telephone visit for your medical care today? YES      NAME: NEELIMA MARCELINO   DOS: 2021  : 1955  PCP: Simone Savage MD    Chief Complaint:    Chief Complaint   Patient presents with   • Carotid stenosis       History of Present Illness:  65 y.o. 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 per her report she denies any stroke or heart attacks but she continues to smoke    She has no TIA or stroke symptoms in the interim    PMHX  Allergies:  Allergies   Allergen Reactions   • Risperdal [Risperidone] Anaphylaxis   • Ipratropium Bromide Other (See Comments)     Laryngitis   • Seroquel [Quetiapine Fumarate]    • Zoloft [Sertraline Hcl]      Medications    Current Outpatient Medications:   •  albuterol (PROVENTIL HFA;VENTOLIN HFA) 108 (90 Base) MCG/ACT inhaler, Inhale 2 puffs Every 4 (Four) Hours As Needed for Wheezing., Disp: , Rfl:   •  Albuterol Sulfate (PROAIR HFA IN), Inhale As Needed., Disp: , Rfl:   •  aspirin (aspirin) 81 MG EC tablet, Take 1 tablet by mouth Daily., Disp: 30 tablet, Rfl: 3  •  ferrous sulfate 325 (65 FE) MG tablet, Take 325 mg by mouth Daily With Breakfast., Disp: , Rfl:   •  Fluticasone Furoate-Vilanterol (BREO ELLIPTA IN), Inhale Daily., Disp: , Rfl:   •  furosemide (LASIX) 40 MG tablet, Take 1 tablet by mouth Daily., Disp: 90 tablet, Rfl: 2  •  guaifenesin-dextromethorphan (MUCINEX DM)  MG tablet sustained-release 12 hour tablet, Take  by mouth 2 (Two) Times a Day As Needed., Disp: , Rfl:   •  lisinopril (PRINIVIL,ZESTRIL) 5 MG tablet, Take 1 tablet by mouth Daily., Disp: 90 tablet, Rfl: 2  •  metFORMIN (GLUCOPHAGE) 1000 MG tablet, Take 1,000 mg by mouth Daily  With Breakfast., Disp: , Rfl:   •  nitroglycerin (NITROSTAT) 0.4 MG SL tablet, 1 under the tongue as needed for angina, may repeat q5mins for up three doses, Disp: 30 tablet, Rfl: 8  •  omeprazole (priLOSEC) 20 MG capsule, Take 20 mg by mouth Daily., Disp: , Rfl:   •  pramipexole (MIRAPEX) 0.25 MG tablet, Take 0.25 mg by mouth Daily., Disp: , Rfl:   •  simvastatin (Zocor) 40 MG tablet, Take 1 tablet by mouth Every Night., Disp: 90 tablet, Rfl: 2  •  sotalol (BETAPACE) 80 MG tablet, Take 1 tablet by mouth 2 (Two) Times a Day., Disp: 180 tablet, Rfl: 2  •  theophylline (MIGDALIA-24) 300 MG 24 hr capsule, Take 300 mg by mouth Daily., Disp: , Rfl:   •  Umeclidinium Bromide (INCRUSE ELLIPTA) 62.5 MCG/INH aerosol powder , Inhale., Disp: , Rfl:   •  warfarin (COUMADIN) 10 MG tablet, Take 10 mg by mouth daily. (PCP monitors), Disp: , Rfl:   Past Medical History:  Past Medical History:   Diagnosis Date   • Anemia    • Anticoagulant long-term use    • Anxiety    • Aortic valve prosthesis present    • Arthritis    • Asthma    • Chronic cough    • Claustrophobia    • Colon polyps    • COPD (chronic obstructive pulmonary disease) (CMS/HCC)     followed by Dr. Kilgore   • Depression    • Diabetes (CMS/HCC)    • GERD (gastroesophageal reflux disease)    • History of anticoagulant therapy     Chronic. 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 Trent   • CARDIAC CATHETERIZATION  06/08/2007     Cath- Mod. AI, Normal coronaries   • CARDIOVASCULAR STRESS TEST  06/04/2007    Stress- 3min, 85% THR. BP- 190/96. R/O Apical Ischemia   • CARDIOVASCULAR STRESS TEST  05/13/2013    L. Myoview- ([facility]. Dr. Hernández) R/O Apical Ischemia   • CARDIOVASCULAR STRESS TEST  09/24/2020    Lexiscan- EF 66%. Negative.   • CHOLECYSTECTOMY  06/2015   • COLONOSCOPY  2009   • COLONOSCOPY N/A 8/23/2016     COLONOSCOPY FOR SCREENING -  Surgeon: Meaghan Pham, DO:Hyperplastic polyp   • ECHO - CONVERTED  07/17/2007    Echo- EF >60%, AVELINO 1.5 cm2   • ECHO - CONVERTED  01/18/2010    Echo- EF >60%. AVELINO 1.3 cm2   • ECHO - CONVERTED  05/03/2013    Echo- (Dr. Alfred) EF 65%   • ECHO - CONVERTED  12/17/2014    Echo- EF 65%. AVELINO- 1.6 cm2   • ECHO - CONVERTED  12/14/2016    LVH, EF 60%, prosthethic aortic valve well seated, RVSP 43 mmHg   • ECHO - CONVERTED  10/16/2017    EF 65%. AVR. RVSP- 24 mmHg   • ECHO - CONVERTED  09/24/2020    EF 60%. AVR. AVELINO- 1.6 Cm2. 18/23 mmHg. Trace MR   • ECHO - CONVERTED  11/11/2020    EF 60%, mild MR, diastolic dysfunction   • IR ANGIO EXT CAROTID CIRC UNILATERAL  12/2015    CTA:  severe stenosis to ALMA, tight stenosis of left subclavian at takeoff   • KNEE SURGERY      Dr. Freeman 1979 & 1980   • TONSILLECTOMY  1973   • TUBAL ABDOMINAL LIGATION  1990   • US CAROTID UNILATERAL  12/22/2015    Bilateral:  small ALMA, CTA recommended.     Social Hx:  Social History     Tobacco Use   • Smoking status: Current Every Day Smoker     Packs/day: 1.50     Years: 39.00     Pack years: 58.50     Types: Cigarettes   • Smokeless tobacco: Never Used   Vaping Use   • Vaping Use: Never used   Substance Use Topics   • Alcohol use: No   • Drug use: No     Family Hx:  Family History   Problem Relation Age of Onset   • Coronary artery disease Mother    • Colon polyps Mother    • Colon cancer Mother    • Ulcers Mother    • Cancer Mother    • Cancer Father    • Diabetes Father    • Hypertension Brother    • Diabetes Brother    •  Heart failure Maternal Grandmother    • Heart failure Maternal Grandfather    • Lung disease Paternal Grandfather      Review of Systems:        Review of Systems   Constitutional: Negative for activity change, appetite change, chills, diaphoresis, fatigue, fever and unexpected weight change.   HENT: Negative for congestion, dental problem, drooling, ear discharge, ear pain, facial swelling, hearing loss, mouth sores, nosebleeds, postnasal drip, rhinorrhea, sinus pressure, sneezing, sore throat, tinnitus, trouble swallowing and voice change.    Eyes: Negative for photophobia, pain, discharge, redness, itching and visual disturbance.   Respiratory: Negative for apnea, cough, choking, chest tightness, shortness of breath, wheezing and stridor.    Cardiovascular: Negative for chest pain, palpitations and leg swelling.   Gastrointestinal: Negative for abdominal distention, abdominal pain, anal bleeding, blood in stool, constipation, diarrhea, nausea, rectal pain and vomiting.   Endocrine: Negative for cold intolerance, heat intolerance, polydipsia, polyphagia and polyuria.   Genitourinary: Negative for decreased urine volume, difficulty urinating, dysuria, enuresis, flank pain, frequency, genital sores, hematuria and urgency.   Musculoskeletal: Negative for arthralgias, back pain, gait problem, joint swelling, myalgias, neck pain and neck stiffness.   Skin: Negative for color change, pallor, rash and wound.   Allergic/Immunologic: Negative for environmental allergies, food allergies and immunocompromised state.   Neurological: Negative for dizziness, tremors, seizures, syncope, facial asymmetry, speech difficulty, weakness, light-headedness, numbness and headaches.   Hematological: Negative for adenopathy. Does not bruise/bleed easily.   Psychiatric/Behavioral: Negative for agitation, behavioral problems, confusion, decreased concentration, dysphoric mood, hallucinations, self-injury, sleep disturbance and suicidal  ideas. The patient is not nervous/anxious and is not hyperactive.    All other systems reviewed and are negative.     I have reviewed this note template and all pertinent parts of the review of systems social, family history, surgical history and medication list      Physical Examination:  There were no vitals filed for this visit.   General Appearance:   Well developed, well nourished, well groomed, alert, and cooperative.  Neurological examination:  Neurologic Exam      Review of Imaging/DATA:  I reviewed her ultrasound personally it shows the presence ofRIGHT:  Moderate-severe plaque right carotid system. No occlusion.  RIGHT CCA PSV:81 cm/s  RIGHT ICA PSV: 401 cm/s  RIGHT ICA EDV: 77 cm/s  Right ICA/CCA Ratio: 5.0  Anterograde flow is demonstrated in RIGHT vertebral artery.     LEFT:  Moderate to severe plaque left carotid system. No occlusion.  LEFT CCA PSV: 110 cm/s  LEFT ICA PSV: 203 cm/s  LEFT EDV: 59 cm/s  Left ICA/CCA Ratio: 1.9  Retrograde flow is demonstrated in LEFT vertebral artery.     Other: Velocity measurements of the left subclavian artery suggest  moderate to high-grade stenosis.        IMPRESSION:  1. Moderate to severe plaque right greater than left carotid systems. No  occlusion.  2. 70-99% stenosis right ICA.  3. 50-69% stenosis left ICA.  4. Retrograde flow left vertebral artery. This appears to be in the  setting of high-grade left subclavian artery stenosis.  5. Antegrade flow noted in right vertebral artery.  Diagnoses/Plan:    Ms. Boyer is a 65 y.o. female   I reviewed her old CTA this is a lady with a probable high-grade left subclavian artery stenosis, right-sided 80% ICA stenosis and a moderate to high-grade calcific left stenosis velocities are 400 with a ratio 5 on the right indicative of greater than 80% stenosis, probable high-grade left proximal common stenosis    She is on Coumadin and should be taken off that she is on aspirin.    Counseled on smoking cessation    Instructed  her to follow-up with cardiology regarding possibility of using Repatha and/or elevating her to the highest dose of Zocor.    Needs to follow-up with her hematology oncology doctor regarding need for anticoagulation use versus risk and benefits of aspirin Plavix should a stent be needed    I talked her about the risk benefits expected outcome again of natural history of this in the face of smoking from my standpoint think it is reasonable to go ahead and get a diagnostic catheter angiogram to see how bad her planning is I explained the risk benefits expected outcome she can stay on her Coumadin for this.  I do not think we would actually intervene    I spent 20 minutes in the care of the patient

## 2021-05-03 DIAGNOSIS — I77.1 STENOSIS OF LEFT SUBCLAVIAN ARTERY (HCC): Primary | ICD-10-CM

## 2021-05-03 NOTE — TELEPHONE ENCOUNTER
Her LDL is 61 and well controlled, so we could try to increase zocor to 80mg M-Th, and 1/2 tab on Fri-Sun

## 2021-05-07 RX ORDER — SIMVASTATIN 80 MG
80 TABLET ORAL NIGHTLY
Qty: 72 TABLET | Refills: 3 | Status: SHIPPED | OUTPATIENT
Start: 2021-05-07

## 2021-05-07 NOTE — TELEPHONE ENCOUNTER
Patient was made aware of recommendation to increase to zocor 80 mg 1 tablet daily Monday through Thursday and 1/2 tablet Friday through Sunday. 72 tablets and 3 refills have been pended to be sent to [facility] in [place].